# Patient Record
Sex: FEMALE | Race: BLACK OR AFRICAN AMERICAN | NOT HISPANIC OR LATINO | Employment: FULL TIME | URBAN - METROPOLITAN AREA
[De-identification: names, ages, dates, MRNs, and addresses within clinical notes are randomized per-mention and may not be internally consistent; named-entity substitution may affect disease eponyms.]

---

## 2023-07-25 LAB
CHLAMYDIA TRACH., AMPLIFIED: POSITIVE
N. GONORRHEA, AMPLIFIED: NEGATIVE
TRICHOMONAS VAGINALIS: NEGATIVE

## 2023-10-05 PROBLEM — A74.9 CHLAMYDIA INFECTION: Status: ACTIVE | Noted: 2023-10-05

## 2023-10-05 PROBLEM — Z97.5 NEXPLANON IN PLACE: Status: ACTIVE | Noted: 2023-10-05

## 2023-10-05 PROBLEM — Z30.46 NEXPLANON REMOVAL: Status: ACTIVE | Noted: 2023-10-05

## 2023-10-05 PROBLEM — H92.03 OTALGIA OF BOTH EARS: Status: ACTIVE | Noted: 2023-10-05

## 2023-10-05 PROBLEM — J06.9 UPPER RESPIRATORY INFECTION: Status: ACTIVE | Noted: 2023-10-05

## 2023-10-05 PROBLEM — N89.8 VAGINAL DISCHARGE: Status: ACTIVE | Noted: 2023-10-05

## 2023-10-05 RX ORDER — DOXYCYCLINE 100 MG/1
100 CAPSULE ORAL 2 TIMES DAILY
COMMUNITY
End: 2024-05-16 | Stop reason: WASHOUT

## 2023-10-05 RX ORDER — FLUTICASONE PROPIONATE 50 MCG
2 SPRAY, SUSPENSION (ML) NASAL DAILY
COMMUNITY
Start: 2022-05-25 | End: 2024-05-16 | Stop reason: WASHOUT

## 2023-10-05 RX ORDER — METRONIDAZOLE 500 MG/1
1 TABLET ORAL 2 TIMES DAILY
COMMUNITY
Start: 2023-07-24 | End: 2024-05-16 | Stop reason: ALTCHOICE

## 2023-10-05 RX ORDER — MEDROXYPROGESTERONE ACETATE 150 MG/ML
1 INJECTION, SUSPENSION INTRAMUSCULAR
COMMUNITY
Start: 2023-07-24 | End: 2024-05-16 | Stop reason: WASHOUT

## 2023-10-05 RX ORDER — ERYTHROMYCIN AND BENZOYL PEROXIDE 30; 50 MG/G; MG/G
GEL TOPICAL
COMMUNITY
Start: 2015-06-02 | End: 2024-05-16 | Stop reason: WASHOUT

## 2023-10-05 RX ORDER — ETONOGESTREL 68 MG/1
IMPLANT SUBCUTANEOUS
COMMUNITY
Start: 2017-08-09

## 2023-10-13 ENCOUNTER — CLINICAL SUPPORT (OUTPATIENT)
Dept: OBSTETRICS AND GYNECOLOGY | Facility: CLINIC | Age: 23
End: 2023-10-13
Payer: COMMERCIAL

## 2023-10-13 DIAGNOSIS — Z30.42 ENCOUNTER FOR SURVEILLANCE OF INJECTABLE CONTRACEPTIVE: Primary | ICD-10-CM

## 2023-10-13 DIAGNOSIS — Z30.09 GENERAL COUNSELING AND ADVICE FOR CONTRACEPTIVE MANAGEMENT: Primary | ICD-10-CM

## 2023-10-13 PROCEDURE — 96372 THER/PROPH/DIAG INJ SC/IM: CPT | Performed by: NURSE PRACTITIONER

## 2023-10-13 RX ORDER — MEDROXYPROGESTERONE ACETATE 150 MG/ML
150 INJECTION, SUSPENSION INTRAMUSCULAR ONCE
Status: COMPLETED | OUTPATIENT
Start: 2023-10-13 | End: 2023-10-13

## 2023-10-13 RX ADMIN — MEDROXYPROGESTERONE ACETATE 150 MG: 150 INJECTION, SUSPENSION INTRAMUSCULAR at 15:11

## 2023-12-29 ENCOUNTER — APPOINTMENT (OUTPATIENT)
Dept: OBSTETRICS AND GYNECOLOGY | Facility: CLINIC | Age: 23
End: 2023-12-29
Payer: COMMERCIAL

## 2023-12-29 ENCOUNTER — CLINICAL SUPPORT (OUTPATIENT)
Dept: OBSTETRICS AND GYNECOLOGY | Facility: CLINIC | Age: 23
End: 2023-12-29
Payer: COMMERCIAL

## 2023-12-29 DIAGNOSIS — Z30.42 ENCOUNTER FOR SURVEILLANCE OF INJECTABLE CONTRACEPTIVE: ICD-10-CM

## 2023-12-29 PROCEDURE — 96372 THER/PROPH/DIAG INJ SC/IM: CPT | Performed by: NURSE PRACTITIONER

## 2023-12-29 RX ORDER — MEDROXYPROGESTERONE ACETATE 150 MG/ML
150 INJECTION, SUSPENSION INTRAMUSCULAR ONCE
Status: COMPLETED | OUTPATIENT
Start: 2023-12-29 | End: 2023-12-29

## 2023-12-29 RX ADMIN — MEDROXYPROGESTERONE ACETATE 150 MG: 150 INJECTION, SUSPENSION INTRAMUSCULAR at 11:40

## 2023-12-29 NOTE — PROGRESS NOTES
Patient in office today for Depo provera injection, patient has not had the Depo since 07/2023. Patient understands that we need a negative urine pregnancy before give the injection.  Urine pregnancy negative    Depo given IM to left glut w/o difficulty   Patient supplied  Lot# [id number]  Exp: 01/31/2028  NDC 96609-743-80      Jackelyn Kan, [facility]

## 2024-03-22 ENCOUNTER — APPOINTMENT (OUTPATIENT)
Dept: OBSTETRICS AND GYNECOLOGY | Facility: CLINIC | Age: 24
End: 2024-03-22
Payer: COMMERCIAL

## 2024-03-25 ENCOUNTER — APPOINTMENT (OUTPATIENT)
Dept: OBSTETRICS AND GYNECOLOGY | Facility: CLINIC | Age: 24
End: 2024-03-25
Payer: COMMERCIAL

## 2024-03-27 ENCOUNTER — HOSPITAL ENCOUNTER (EMERGENCY)
Facility: HOSPITAL | Age: 24
Discharge: HOME | End: 2024-03-27
Payer: COMMERCIAL

## 2024-03-27 VITALS
BODY MASS INDEX: 19.49 KG/M2 | RESPIRATION RATE: 18 BRPM | DIASTOLIC BLOOD PRESSURE: 84 MMHG | OXYGEN SATURATION: 100 % | SYSTOLIC BLOOD PRESSURE: 137 MMHG | WEIGHT: 110 LBS | TEMPERATURE: 98.4 F | HEART RATE: 98 BPM | HEIGHT: 63 IN

## 2024-03-27 DIAGNOSIS — K08.89 PAIN, DENTAL: Primary | ICD-10-CM

## 2024-03-27 PROCEDURE — 2500000004 HC RX 250 GENERAL PHARMACY W/ HCPCS (ALT 636 FOR OP/ED): Performed by: PHYSICIAN ASSISTANT

## 2024-03-27 PROCEDURE — 2500000001 HC RX 250 WO HCPCS SELF ADMINISTERED DRUGS (ALT 637 FOR MEDICARE OP): Performed by: PHYSICIAN ASSISTANT

## 2024-03-27 PROCEDURE — 99283 EMERGENCY DEPT VISIT LOW MDM: CPT

## 2024-03-27 PROCEDURE — 96372 THER/PROPH/DIAG INJ SC/IM: CPT

## 2024-03-27 PROCEDURE — 96372 THER/PROPH/DIAG INJ SC/IM: CPT | Performed by: PHYSICIAN ASSISTANT

## 2024-03-27 RX ORDER — KETOROLAC TROMETHAMINE 30 MG/ML
15 INJECTION, SOLUTION INTRAMUSCULAR; INTRAVENOUS ONCE
Status: COMPLETED | OUTPATIENT
Start: 2024-03-27 | End: 2024-03-27

## 2024-03-27 RX ORDER — AMOXICILLIN AND CLAVULANATE POTASSIUM 400; 57 MG/5ML; MG/5ML
875 POWDER, FOR SUSPENSION ORAL 2 TIMES DAILY
Qty: 308 ML | Refills: 0 | Status: SHIPPED | OUTPATIENT
Start: 2024-03-27 | End: 2024-04-10

## 2024-03-27 RX ORDER — ACETAMINOPHEN 160 MG/5ML
650 SOLUTION ORAL ONCE
Status: COMPLETED | OUTPATIENT
Start: 2024-03-27 | End: 2024-03-27

## 2024-03-27 RX ORDER — TRIPROLIDINE/PSEUDOEPHEDRINE 2.5MG-60MG
600 TABLET ORAL EVERY 6 HOURS
Qty: 840 ML | Refills: 0 | Status: SHIPPED | OUTPATIENT
Start: 2024-03-27 | End: 2024-04-03

## 2024-03-27 RX ORDER — AMOXICILLIN AND CLAVULANATE POTASSIUM 600; 42.9 MG/5ML; MG/5ML
875 POWDER, FOR SUSPENSION ORAL ONCE
Status: COMPLETED | OUTPATIENT
Start: 2024-03-27 | End: 2024-03-27

## 2024-03-27 RX ADMIN — KETOROLAC TROMETHAMINE 15 MG: 30 INJECTION, SOLUTION INTRAMUSCULAR at 22:58

## 2024-03-27 RX ADMIN — AMOXICILLIN AND CLAVULANATE POTASSIUM 875 MG: 600; 42.9 POWDER, FOR SUSPENSION ORAL at 23:01

## 2024-03-27 RX ADMIN — ACETAMINOPHEN 650 MG: 650 SOLUTION ORAL at 22:58

## 2024-03-27 ASSESSMENT — COLUMBIA-SUICIDE SEVERITY RATING SCALE - C-SSRS
1. IN THE PAST MONTH, HAVE YOU WISHED YOU WERE DEAD OR WISHED YOU COULD GO TO SLEEP AND NOT WAKE UP?: NO
2. HAVE YOU ACTUALLY HAD ANY THOUGHTS OF KILLING YOURSELF?: NO
6. HAVE YOU EVER DONE ANYTHING, STARTED TO DO ANYTHING, OR PREPARED TO DO ANYTHING TO END YOUR LIFE?: NO

## 2024-03-27 ASSESSMENT — PAIN - FUNCTIONAL ASSESSMENT
PAIN_FUNCTIONAL_ASSESSMENT: 0-10
PAIN_FUNCTIONAL_ASSESSMENT: 0-10

## 2024-03-27 ASSESSMENT — PAIN DESCRIPTION - LOCATION
LOCATION: MOUTH
LOCATION: TEETH

## 2024-03-27 ASSESSMENT — PAIN SCALES - GENERAL
PAINLEVEL_OUTOF10: 8
PAINLEVEL_OUTOF10: 6

## 2024-03-27 ASSESSMENT — PAIN DESCRIPTION - PAIN TYPE: TYPE: ACUTE PAIN

## 2024-03-28 NOTE — ED PROVIDER NOTES
HPI     CC: Dental Pain (Left lower)     HPI: Manuela Rodrigez is a 23 y.o. female with no past medical history presents with concern for dental pain.  Patient states that her left lower wisdom tooth has been causing her pain over the last 4 days.  At the same time, she has felt slightly off balance and with some ear pain that also radiates down to the left lower portion of her ear.  She has tried Tylenol and Orajel with some relief.  She is scheduled for wisdom tooth extraction on 4/11.  She called and made that appointment today, however they could not see her in the office.  She has not been on any antibiotics recently.  Denies fevers or chills.  Has been able to eat or drink but does report some sensitivity to cold or hot foods.  She states that she has noticed that food gets stuck in her wisdom teeth often and she has had an infection there before.  This is part of the reason that she has wanting to have them removed.  She reports no neck pain, lightheadedness, or room spinning sensation.  She states that she is on birth control and laughs that the chance of pregnancy.    ROS: 10-point review of systems was performed and is otherwise negative except as noted in HPI.      Past Medical History: Noncontributory except per HPI     Past Surgical History: Noncontributory except per HPI     Family History: Reviewed and noncontributory     Social History:  Noncontributory except per HPI       No Known Allergies    Home Meds:   Current Outpatient Medications   Medication Instructions    amoxicillin-pot clavulanate (Augmentin) 400-57 mg/5 mL suspension 875 mg, oral, 2 times daily    doxycycline (MONODOX) 100 mg, oral, 2 times daily, With food    erythromycin-benzoyl peroxide (Benzamycin) gel 1 Application    etonogestrel-eluting contraceptive (Nexplanon) 68 mg implant implant Inserted 8/9/2017 by Dyana Dooley CNM    fluticasone (Flonase) 50 mcg/actuation nasal spray 2 sprays, Each Nostril, Daily    ibuprofen 600 mg, oral,  "Every 6 hours    medroxyPROGESTERone 150 mg/mL injection 1 mL, intramuscular, Every 3 months    metroNIDAZOLE (Flagyl) 500 mg tablet 1 tablet, oral, 2 times daily        ED Triage Vitals [03/27/24 2232]   Temperature Heart Rate Respirations BP   36.9 °C (98.4 °F) 98 18 137/84      Pulse Ox Temp Source Heart Rate Source Patient Position   100 % Temporal Monitor Sitting      BP Location FiO2 (%)     Left arm --         Heart Rate:  [98]   Temperature:  [36.9 °C (98.4 °F)]   Respirations:  [18]   BP: (137)/(84)   Height:  [160 cm (5' 3\")]   Weight:  [49.9 kg (110 lb)]   Pulse Ox:  [100 %]      Physical Exam:  Physical Exam  Constitutional:       General: She is not in acute distress.     Appearance: Normal appearance. She is not ill-appearing.   HENT:      Head: Normocephalic and atraumatic.      Right Ear: Tympanic membrane and external ear normal.      Left Ear: Tympanic membrane and external ear normal.      Nose: Nose normal.      Mouth/Throat:      Mouth: Mucous membranes are moist.        Comments: No facial swelling, no facial or neck tenderness.  No neck swelling.  No redness or warmth to the face or neck.  Range of motion of the neck is intact.  No posterior oropharynx changes.    Left lower tooth as pictured is the area of tenderness.  Impacted wisdom tooth is present.  Minimal surrounding erythema to the gumline without active abscess.  Right lower wisdom tooth appears similarly but is not uncomfortable at this time.    Braces also in place over the first front teeth of bilateral upper and lower teeth.  Eyes:      Extraocular Movements: Extraocular movements intact.      Conjunctiva/sclera: Conjunctivae normal.      Pupils: Pupils are equal, round, and reactive to light.   Cardiovascular:      Rate and Rhythm: Normal rate and regular rhythm.      Pulses: Normal pulses.   Pulmonary:      Effort: Pulmonary effort is normal. No respiratory distress.      Breath sounds: Normal breath sounds.   Abdominal:      " General: Abdomen is flat.      Palpations: Abdomen is soft.      Tenderness: There is no abdominal tenderness.   Musculoskeletal:         General: Normal range of motion.      Cervical back: Normal range of motion and neck supple.   Skin:     General: Skin is warm and dry.      Capillary Refill: Capillary refill takes less than 2 seconds.   Neurological:      General: No focal deficit present.      Mental Status: She is alert and oriented to person, place, and time.   Psychiatric:         Mood and Affect: Mood normal.          Diagnostic Results        Labs Reviewed - No data to display      No orders to display                 No data recorded                Procedure  Procedures    ED Course & MDM   Assessment/Plan:     Medications   ketorolac (Toradol) injection 15 mg (has no administration in time range)   acetaminophen (Tylenol) oral liquid 650 mg (has no administration in time range)   amoxicillin-pot clavulanate (Augmentin) 600-42.9 mg/5 mL suspension 875 mg (has no administration in time range)        Diagnoses as of 03/27/24 5532   Pain, dental       Medical Decision Making    Manuela Rodrigez is a 23 y.o. female with no past medical history presents with dental pain.  The patient is nontoxic-appearing and her vital signs are normal.  Based on her presentation and symptoms, differential diagnosis includes dental infection, dental carry, impacted wisdom tooth, tooth nerve root impingement, otitis media, or other referred pain.  Based on her physical exam, most likely diagnosis is wisdom tooth impaction versus dental infection.  Patient's ear is unremarkable.  She is balanced upon walking today and has no further signs of being off balance at this time.  Feel that this may be a referred symptom due to some compression near the ear from the impacted wisdom tooth.  Given that is not associated with any other symptoms, would like the patient to try anti-inflammatories and antibiotics and follow-up with dentist  prior to further investigation.  Patient is agreeable to this plan of care.  Will be given Toradol for pain control, Tylenol for pain control, and Augmentin.  Patient specifically requested liquid medications.  Given that she is hemodynamically stable, no further workup indicated at this time.    Dental pain - I discussed the diagnosis of dental pain with the patient.  I recommended calling the dentist office to discuss an earlier surgical date and/or follow-up exam within the next few days.  I have prescribed Ibuprofen and Tylenol for pain control. I have given Augmentin liquid for treatment of infection encouraged the patient to take this in its entirety.  We discussed that.  The combination of treatment should hopefully improve the symptoms that she is also experiencing in her left ear.  If it does not, highly recommended returning to the emergency department or seeing primary care physician.  The patient agrees to call her dentist tomorrow for repeat exam and/or earlier appointment for wisdom tooth extraction.  We did discuss that ultimately, her pain will improve when these are removed.  Also highly encouraged her to keep this area clean as she does report that food gets trapped behind the tooth often which could lead to further infection.  Advised to return to the ED should she experience new or worsening symptoms including but not limited to fever, chills, worsening pain, drainage from tooth, or any new symptoms. Patient was agreeable to discharge home.    Disposition: Home    ED Prescriptions       Medication Sig Dispense Start Date End Date Auth. Provider    amoxicillin-pot clavulanate (Augmentin) 400-57 mg/5 mL suspension Take 10.9 mL (875 mg) by mouth 2 times a day for 14 days. 308 mL 3/27/2024 4/10/2024 Светлана Turner PA-C    ibuprofen 100 mg/5 mL suspension Take 30 mL (600 mg) by mouth every 6 hours for 7 days. 840 mL 3/27/2024 4/3/2024 Светлана Turner PA-C            Social Determinants Affecting  Care: none     Светлана Turner PA-C    This note was dictated by speech recognition. Minor errors in transcription may be present.     Светлана Turner PA-C  03/27/24 4435

## 2024-03-31 ENCOUNTER — CLINICAL SUPPORT (OUTPATIENT)
Dept: EMERGENCY MEDICINE | Facility: HOSPITAL | Age: 24
End: 2024-03-31
Payer: COMMERCIAL

## 2024-03-31 ENCOUNTER — HOSPITAL ENCOUNTER (EMERGENCY)
Facility: HOSPITAL | Age: 24
Discharge: HOME | End: 2024-03-31
Attending: EMERGENCY MEDICINE
Payer: COMMERCIAL

## 2024-03-31 VITALS
OXYGEN SATURATION: 100 % | DIASTOLIC BLOOD PRESSURE: 69 MMHG | HEART RATE: 77 BPM | SYSTOLIC BLOOD PRESSURE: 111 MMHG | TEMPERATURE: 98 F | RESPIRATION RATE: 18 BRPM

## 2024-03-31 DIAGNOSIS — R42 VERTIGO: Primary | ICD-10-CM

## 2024-03-31 PROCEDURE — 99284 EMERGENCY DEPT VISIT MOD MDM: CPT | Performed by: EMERGENCY MEDICINE

## 2024-03-31 PROCEDURE — 99283 EMERGENCY DEPT VISIT LOW MDM: CPT | Mod: 25

## 2024-03-31 PROCEDURE — 2500000001 HC RX 250 WO HCPCS SELF ADMINISTERED DRUGS (ALT 637 FOR MEDICARE OP)

## 2024-03-31 PROCEDURE — 93005 ELECTROCARDIOGRAM TRACING: CPT

## 2024-03-31 PROCEDURE — 93010 ELECTROCARDIOGRAM REPORT: CPT | Performed by: EMERGENCY MEDICINE

## 2024-03-31 RX ORDER — MECLIZINE HCL 12.5 MG 12.5 MG/1
25 TABLET ORAL ONCE
Status: COMPLETED | OUTPATIENT
Start: 2024-03-31 | End: 2024-03-31

## 2024-03-31 RX ORDER — MECLIZINE HYDROCHLORIDE 25 MG/1
25 TABLET ORAL 3 TIMES DAILY PRN
Qty: 90 TABLET | Refills: 0 | Status: SHIPPED | OUTPATIENT
Start: 2024-03-31 | End: 2024-04-30

## 2024-03-31 RX ADMIN — MECLIZINE 25 MG: 12.5 TABLET ORAL at 16:15

## 2024-03-31 ASSESSMENT — COLUMBIA-SUICIDE SEVERITY RATING SCALE - C-SSRS
2. HAVE YOU ACTUALLY HAD ANY THOUGHTS OF KILLING YOURSELF?: NO
6. HAVE YOU EVER DONE ANYTHING, STARTED TO DO ANYTHING, OR PREPARED TO DO ANYTHING TO END YOUR LIFE?: NO
1. IN THE PAST MONTH, HAVE YOU WISHED YOU WERE DEAD OR WISHED YOU COULD GO TO SLEEP AND NOT WAKE UP?: NO

## 2024-03-31 NOTE — DISCHARGE INSTRUCTIONS
You have been diagnosed with vertigo here in the emergency department today.  You have been provided with a prescription for meclizine which you are instructed to take 3 times per day as needed for your symptoms.  Please return the emergency department if you begin having significant worsening of your symptoms, weakness on one side of the body versus the other, changes in sensation, significant changes in vision or any other concerning symptoms.  You should follow-up with your primary care provider for continued monitoring and refills for this prescription.

## 2024-03-31 NOTE — ED PROVIDER NOTES
CC: Dizziness     History provided by: Patient  Limitations to History: None    HPI:  Manuela Rodrigez is a 23 y.o. female with no pertinent past medical history that presents to the emergency department today for dizziness.  She states that this has been intermittent over the past few months and describes this as room spinning and some mild lightheadedness.  She says that this does come on when she is driving and changing positions and improves when she lays down.  The patient does not take any daily medications and is on the Depo shot for birth control.  The patient denies any associated chest pain, shortness of breath, syncopal episodes, palpitations, N/V/D, fevers, back pain, neck pain/stiffness or any other concerning symptoms.  Patient does have an impacted wisdom tooth that she is scheduled to have pulled in the next few weeks.  ???????????????????????????????????????????????????????????????  Triage Vitals:  T 36.7 °C (98 °F)  HR 77  /69  RR 18  O2 100 %      Vital signs reviewed in nursing triage note, EMR flow sheets, and at patient's bedside.   General: Awake, alert, in no acute distress  Eyes: Gaze conjugate.  No scleral icterus or injection  HENT: Normo-cephalic, atraumatic. No stridor. No rhinorrhea or epistaxis.  CV: Regular rhythm. No murmurs appreciated. Radial pulses 2+ bilaterally  Respiratory: Breathing non-labored, speaking in full sentences.  Clear to auscultation bilaterally  GI: Soft, non-distended, non-tender. No rebound or guarding.  MSK/Extremities: No gross bony deformities. Moving all extremities. No lower extremity edema.  Skin: Warm. Appropriate color  Neuro: A&Ox3.  Normal speech pattern. PERRLA. EOMI. Visual fields intact bilaterally.  No facial droop.  Symmetric smile.  Equal cheek puffing.  Normal sensation to light touch in V1-3.  5/5 strength shoulder shrug.  5/5 strength in bilateral UE and LE.  Normal sensation to light touch in bilateral UE and LE. No dysmetria w/  finger-nose-finger or heel-shin bilaterally. No ataxic gait.  Joyce-Hallpike positive  Psych: Appropriate mood and affect   ???????????????????????????????????????????????????????????????  ED Course/Treatment/Medical Decision Making  MDM:  Manuela Rodrigez is a 23 y.o. female with no pertinent past medical history that presents to the emergency department today for symptoms consistent with vertigo.  Upon arrival to the emergency department patient had stable vital signs and was afebrile.  Patient had a normal neurologic exam but was Burton-Hallpike positive making vertigo the most likely diagnosis.  The patient was treated with meclizine and her symptoms improved after administration of this.    Differential diagnoses considered include but are not limited to: CVA, vertigo, dehydration, arrhythmia    Independent Interpretation of Studies:  I independently interpreted: See ED Course Below  -EKG    ED Course:  ED Course as of 03/31/24 1635   Sun Mar 31, 2024   1623 ECG 12 lead  EKG personally interpreted by me showing NSR at rate of 79 bpm with normal axis.  MN, QRS and QTc intervals are within normal limits.  No ST elevation or T wave inversions noted.  No STEMI.  T wave inversions in lead V1-V3 and lead V6 no longer present from previous EKG on 8/29/2003. [RS]      ED Course User Index  [RS] Demian Jarrell, DO         Diagnoses as of 03/31/24 1635   Vertigo     The patient was monitored for any change in vital signs or symptoms and remained hemodynamically stable throughout the ED course.  Her symptoms did improve after treatment with meclizine and felt safe for discharge home.  The patient was provided with strict return precautions including focal weakness, altered sensation, vision changes, significant worsening of her dizziness/lightheadedness, syncopal episode or any other concerning symptoms.  Patient was also instructed to follow-up with her primary care provider within the next 2 weeks for reevaluation of her  symptoms and prescription refills.  The patient verbalized her understanding of this and she was discharged home in stable condition.    Social Determinants Limiting Care:  None identified    Impression:  Vertigo    Disposition:  Discharge home    Assessment and plan discussed with Dr. John Jarrell, DO   Emergency Medicine, PGY-1     Disclaimer: This note was dictated by speech recognition. Minor errors in transcription may be present.     Procedures ? [employer] last updated 3/31/2024 3:57 PM        Demian Jarrell,   Resident  03/31/24 4524

## 2024-03-31 NOTE — ED TRIAGE NOTES
"Pt presents to the ED for dizziness that has been going on for \"a while\"  States it is intermittent, happens while she is driving (also has anxiety while driving)  Thinks her dizziness is getting worse because she needs her wisdom tooth removed  "

## 2024-04-03 LAB
ATRIAL RATE: 79 BPM
P AXIS: 68 DEGREES
P OFFSET: 183 MS
P ONSET: 138 MS
PR INTERVAL: 164 MS
Q ONSET: 220 MS
QRS COUNT: 13 BEATS
QRS DURATION: 76 MS
QT INTERVAL: 372 MS
QTC CALCULATION(BAZETT): 426 MS
QTC FREDERICIA: 407 MS
R AXIS: 69 DEGREES
T AXIS: 48 DEGREES
T OFFSET: 406 MS
VENTRICULAR RATE: 79 BPM

## 2024-04-28 ENCOUNTER — CLINICAL SUPPORT (OUTPATIENT)
Dept: EMERGENCY MEDICINE | Facility: HOSPITAL | Age: 24
End: 2024-04-28
Payer: COMMERCIAL

## 2024-04-28 ENCOUNTER — HOSPITAL ENCOUNTER (EMERGENCY)
Facility: HOSPITAL | Age: 24
Discharge: HOME | End: 2024-04-28
Attending: EMERGENCY MEDICINE
Payer: COMMERCIAL

## 2024-04-28 VITALS
SYSTOLIC BLOOD PRESSURE: 120 MMHG | DIASTOLIC BLOOD PRESSURE: 81 MMHG | WEIGHT: 116 LBS | HEART RATE: 90 BPM | OXYGEN SATURATION: 100 % | HEIGHT: 62 IN | BODY MASS INDEX: 21.35 KG/M2 | TEMPERATURE: 97.7 F | RESPIRATION RATE: 16 BRPM

## 2024-04-28 DIAGNOSIS — R42 DIZZY: Primary | ICD-10-CM

## 2024-04-28 LAB
ANION GAP SERPL CALC-SCNC: 15 MMOL/L (ref 10–20)
BASOPHILS # BLD MANUAL: 0.05 X10*3/UL (ref 0–0.1)
BASOPHILS NFR BLD MANUAL: 1.4 %
BUN SERPL-MCNC: 7 MG/DL (ref 6–23)
CALCIUM SERPL-MCNC: 9.4 MG/DL (ref 8.6–10.6)
CHLORIDE SERPL-SCNC: 103 MMOL/L (ref 98–107)
CO2 SERPL-SCNC: 25 MMOL/L (ref 21–32)
CREAT SERPL-MCNC: 0.73 MG/DL (ref 0.5–1.05)
D DIMER PPP FEU-MCNC: 352 NG/ML FEU
EGFRCR SERPLBLD CKD-EPI 2021: >90 ML/MIN/1.73M*2
EOSINOPHIL # BLD MANUAL: 0.05 X10*3/UL (ref 0–0.7)
EOSINOPHIL NFR BLD MANUAL: 1.3 %
ERYTHROCYTE [DISTWIDTH] IN BLOOD BY AUTOMATED COUNT: 11.4 % (ref 11.5–14.5)
GLUCOSE SERPL-MCNC: 75 MG/DL (ref 74–99)
HCT VFR BLD AUTO: 38.9 % (ref 36–46)
HGB BLD-MCNC: 13.9 G/DL (ref 12–16)
IMM GRANULOCYTES # BLD AUTO: 0 X10*3/UL (ref 0–0.7)
IMM GRANULOCYTES NFR BLD AUTO: 0 % (ref 0–0.9)
LYMPHOCYTES # BLD MANUAL: 1.69 X10*3/UL (ref 1.2–4.8)
LYMPHOCYTES NFR BLD MANUAL: 48.3 %
MAGNESIUM SERPL-MCNC: 1.96 MG/DL (ref 1.6–2.4)
MCH RBC QN AUTO: 31.4 PG (ref 26–34)
MCHC RBC AUTO-ENTMCNC: 35.7 G/DL (ref 32–36)
MCV RBC AUTO: 88 FL (ref 80–100)
MONOCYTES # BLD MANUAL: 0.17 X10*3/UL (ref 0.1–1)
MONOCYTES NFR BLD MANUAL: 4.8 %
NEUTROPHILS # BLD MANUAL: 1.52 X10*3/UL (ref 1.2–7.7)
NEUTS BAND # BLD MANUAL: 0.38 X10*3/UL (ref 0–0.7)
NEUTS BAND NFR BLD MANUAL: 10.9 %
NEUTS SEG # BLD MANUAL: 1.14 X10*3/UL (ref 1.2–7)
NEUTS SEG NFR BLD MANUAL: 32.6 %
NRBC BLD-RTO: 0 /100 WBCS (ref 0–0)
PLATELET # BLD AUTO: 271 X10*3/UL (ref 150–450)
POTASSIUM SERPL-SCNC: 3 MMOL/L (ref 3.5–5.3)
PREGNANCY TEST URINE, POC: NEGATIVE
RBC # BLD AUTO: 4.43 X10*6/UL (ref 4–5.2)
RBC MORPH BLD: ABNORMAL
SODIUM SERPL-SCNC: 140 MMOL/L (ref 136–145)
TOTAL CELLS COUNTED BLD: 147
TSH SERPL-ACNC: 2.29 MIU/L (ref 0.44–3.98)
VARIANT LYMPHS # BLD MANUAL: 0.02 X10*3/UL (ref 0–0.5)
VARIANT LYMPHS NFR BLD: 0.7 %
WBC # BLD AUTO: 3.5 X10*3/UL (ref 4.4–11.3)

## 2024-04-28 PROCEDURE — 81025 URINE PREGNANCY TEST: CPT | Performed by: NURSE PRACTITIONER

## 2024-04-28 PROCEDURE — 83735 ASSAY OF MAGNESIUM: CPT | Performed by: NURSE PRACTITIONER

## 2024-04-28 PROCEDURE — 2500000001 HC RX 250 WO HCPCS SELF ADMINISTERED DRUGS (ALT 637 FOR MEDICARE OP): Performed by: NURSE PRACTITIONER

## 2024-04-28 PROCEDURE — 85027 COMPLETE CBC AUTOMATED: CPT | Performed by: NURSE PRACTITIONER

## 2024-04-28 PROCEDURE — 36415 COLL VENOUS BLD VENIPUNCTURE: CPT | Performed by: NURSE PRACTITIONER

## 2024-04-28 PROCEDURE — 80048 BASIC METABOLIC PNL TOTAL CA: CPT | Performed by: NURSE PRACTITIONER

## 2024-04-28 PROCEDURE — 85007 BL SMEAR W/DIFF WBC COUNT: CPT | Performed by: NURSE PRACTITIONER

## 2024-04-28 PROCEDURE — 85379 FIBRIN DEGRADATION QUANT: CPT | Performed by: NURSE PRACTITIONER

## 2024-04-28 PROCEDURE — 99285 EMERGENCY DEPT VISIT HI MDM: CPT | Performed by: NURSE PRACTITIONER

## 2024-04-28 PROCEDURE — 84443 ASSAY THYROID STIM HORMONE: CPT | Performed by: NURSE PRACTITIONER

## 2024-04-28 PROCEDURE — 99283 EMERGENCY DEPT VISIT LOW MDM: CPT | Mod: 25

## 2024-04-28 PROCEDURE — 93005 ELECTROCARDIOGRAM TRACING: CPT

## 2024-04-28 RX ORDER — POTASSIUM CHLORIDE 20 MEQ/1
40 TABLET, EXTENDED RELEASE ORAL DAILY
Status: DISCONTINUED | OUTPATIENT
Start: 2024-04-28 | End: 2024-04-28

## 2024-04-28 RX ORDER — ONDANSETRON 4 MG/1
4 TABLET, ORALLY DISINTEGRATING ORAL EVERY 6 HOURS PRN
Qty: 20 TABLET | Refills: 0 | Status: SHIPPED | OUTPATIENT
Start: 2024-04-28 | End: 2024-05-21 | Stop reason: WASHOUT

## 2024-04-28 RX ORDER — POTASSIUM CHLORIDE 1.5 G/1.58G
40 POWDER, FOR SOLUTION ORAL 2 TIMES DAILY
Status: DISCONTINUED | OUTPATIENT
Start: 2024-04-28 | End: 2024-04-28 | Stop reason: HOSPADM

## 2024-04-28 RX ADMIN — POTASSIUM CHLORIDE 40 MEQ: 1.5 POWDER, FOR SOLUTION ORAL at 16:22

## 2024-04-28 ASSESSMENT — COLUMBIA-SUICIDE SEVERITY RATING SCALE - C-SSRS
6. HAVE YOU EVER DONE ANYTHING, STARTED TO DO ANYTHING, OR PREPARED TO DO ANYTHING TO END YOUR LIFE?: NO
2. HAVE YOU ACTUALLY HAD ANY THOUGHTS OF KILLING YOURSELF?: NO
1. IN THE PAST MONTH, HAVE YOU WISHED YOU WERE DEAD OR WISHED YOU COULD GO TO SLEEP AND NOT WAKE UP?: NO

## 2024-04-28 NOTE — ED PROVIDER NOTES
HPI   Chief Complaint   Patient presents with    Vertigo       HPI  This is a 23 year old female with no significant past medical history presents to the ED with vertigo like symptoms. She is on Depo.   She states that when she changes position she feels nauseous and the room is spinning. She was here about a month ago for the same and was discharged home with meclizine. She has been taking meclizine with no relief in her symptoms. She also has had dyspnea associated with her symptoms but she is not sure whether it is because of anxiety or true dyspnea because something is wrong.  Denies chest pain, no recent travel and no known sick contact although she works here at .                     No data recorded                   Patient History   Past Medical History:   Diagnosis Date    Other specified health status     No pertinent past medical history     Past Surgical History:   Procedure Laterality Date    OTHER SURGICAL HISTORY  10/17/2019    No history of surgery     No family history on file.  Social History     Tobacco Use    Smoking status: Not on file    Smokeless tobacco: Not on file   Substance Use Topics    Alcohol use: Not on file    Drug use: Not on file       Physical Exam   ED Triage Vitals [04/28/24 1342]   Temperature Heart Rate Respirations BP   36.5 °C (97.7 °F) 90 16 120/81      Pulse Ox Temp src Heart Rate Source Patient Position   100 % -- Monitor Sitting      BP Location FiO2 (%)     Right arm --       Physical Exam  Constitutional:       Appearance: Normal appearance.   HENT:      Head: Normocephalic and atraumatic.      Mouth/Throat:      Mouth: Mucous membranes are moist.   Eyes:      Extraocular Movements: Extraocular movements intact.      Pupils: Pupils are equal, round, and reactive to light.   Cardiovascular:      Rate and Rhythm: Normal rate and regular rhythm.   Pulmonary:      Effort: Pulmonary effort is normal.      Breath sounds: Normal breath sounds.   Abdominal:      General:  Abdomen is flat.      Palpations: Abdomen is soft.   Musculoskeletal:         General: Normal range of motion.      Cervical back: Normal range of motion and neck supple.   Skin:     General: Skin is warm and dry.   Neurological:      General: No focal deficit present.      Mental Status: She is alert and oriented to person, place, and time.   Psychiatric:         Mood and Affect: Mood normal.         Behavior: Behavior normal.         Thought Content: Thought content normal.         Judgment: Judgment normal.         ED Course & MDM   Diagnoses as of 04/28/24 1722   Dizzy       Medical Decision Making  This is a 23 year old female with no significant past medical history presents to the ED with vertigo like symptoms. She is on Depo.   She states that when she changes position she feels nauseous and the room is spinning. She was here about a month ago for the same and was discharged home with meclizine. She has been taking meclizine with no relief in her symptoms. She also has had dyspnea associated with her symptoms but she is not sure whether it is because of anxiety or true dyspnea because something is wrong.  Denies chest pain, no recent travel and no known sick contact although she works here at .   Differential Dx- vertigo, dehydration, intracranial abnormalities, vascular abnormalities, tumor   ECG showed Hr of 80 with DC interval of 168 ms, non-specific ST abnormalities, with no acute ischemia       Labs-  mostly unremarkable except for potassium of 3 for which she received 40 meq of potassium, TSH 2.296 and dimer of 352  On exam she had no nystagmus and I was not able to illicit her symptoms. She had no lightheadedness and no nausea or vomiting. Had no chest pain , headache , blurry or double vision.    The patient was staffed with Dr. Polk who recommended MRI of the brain with MRV but the patient wished not to wait for the MRI since it was going to be done after 7 pm.   She was discharged home with  an Rx for Zofran and I instructed her to follow up with her primary care.     Procedure  Procedures     Zonia Peña, GRAHAM-CNP, DNP  04/28/24 6454

## 2024-04-28 NOTE — ED TRIAGE NOTES
Pt state that start happened 2 month's ago when she was Dx with Vertigo. Pt supposed to call Neuro and make appointment she never did. Pt state that she start having nausea and room start spinning. No LOC

## 2024-04-29 ENCOUNTER — CLINICAL SUPPORT (OUTPATIENT)
Dept: EMERGENCY MEDICINE | Facility: HOSPITAL | Age: 24
End: 2024-04-29
Payer: COMMERCIAL

## 2024-04-29 ENCOUNTER — HOSPITAL ENCOUNTER (EMERGENCY)
Facility: HOSPITAL | Age: 24
Discharge: HOME | End: 2024-04-29
Attending: EMERGENCY MEDICINE
Payer: COMMERCIAL

## 2024-04-29 VITALS
TEMPERATURE: 98.6 F | HEIGHT: 62 IN | OXYGEN SATURATION: 100 % | SYSTOLIC BLOOD PRESSURE: 103 MMHG | RESPIRATION RATE: 16 BRPM | WEIGHT: 116 LBS | BODY MASS INDEX: 21.35 KG/M2 | DIASTOLIC BLOOD PRESSURE: 67 MMHG | HEART RATE: 85 BPM

## 2024-04-29 DIAGNOSIS — R42 LIGHTHEADEDNESS: Primary | ICD-10-CM

## 2024-04-29 LAB
ANION GAP SERPL CALC-SCNC: 13 MMOL/L (ref 10–20)
ATRIAL RATE: 85 BPM
BASOPHILS # BLD MANUAL: 0 X10*3/UL (ref 0–0.1)
BASOPHILS NFR BLD MANUAL: 0 %
BUN SERPL-MCNC: 6 MG/DL (ref 6–23)
CALCIUM SERPL-MCNC: 9 MG/DL (ref 8.6–10.6)
CHLORIDE SERPL-SCNC: 107 MMOL/L (ref 98–107)
CO2 SERPL-SCNC: 22 MMOL/L (ref 21–32)
CREAT SERPL-MCNC: 0.71 MG/DL (ref 0.5–1.05)
EGFRCR SERPLBLD CKD-EPI 2021: >90 ML/MIN/1.73M*2
EOSINOPHIL # BLD MANUAL: 0 X10*3/UL (ref 0–0.7)
EOSINOPHIL NFR BLD MANUAL: 0 %
ERYTHROCYTE [DISTWIDTH] IN BLOOD BY AUTOMATED COUNT: 11.5 % (ref 11.5–14.5)
GLUCOSE SERPL-MCNC: 90 MG/DL (ref 74–99)
HCT VFR BLD AUTO: 34.7 % (ref 36–46)
HGB BLD-MCNC: 12.4 G/DL (ref 12–16)
IMM GRANULOCYTES # BLD AUTO: 0.01 X10*3/UL (ref 0–0.7)
IMM GRANULOCYTES NFR BLD AUTO: 0.3 % (ref 0–0.9)
LYMPHOCYTES # BLD MANUAL: 1.61 X10*3/UL (ref 1.2–4.8)
LYMPHOCYTES NFR BLD MANUAL: 42.4 %
MCH RBC QN AUTO: 31.3 PG (ref 26–34)
MCHC RBC AUTO-ENTMCNC: 35.7 G/DL (ref 32–36)
MCV RBC AUTO: 88 FL (ref 80–100)
MONOCYTES # BLD MANUAL: 0.42 X10*3/UL (ref 0.1–1)
MONOCYTES NFR BLD MANUAL: 11 %
NEUTS SEG # BLD MANUAL: 1.71 X10*3/UL (ref 1.2–7)
NEUTS SEG NFR BLD MANUAL: 44.9 %
NRBC BLD-RTO: 0 /100 WBCS (ref 0–0)
P AXIS: 81 DEGREES
P OFFSET: 183 MS
P ONSET: 140 MS
PLATELET # BLD AUTO: 249 X10*3/UL (ref 150–450)
POTASSIUM SERPL-SCNC: 3.6 MMOL/L (ref 3.5–5.3)
PR INTERVAL: 168 MS
Q ONSET: 224 MS
QRS COUNT: 13 BEATS
QRS DURATION: 80 MS
QT INTERVAL: 362 MS
QTC CALCULATION(BAZETT): 430 MS
QTC FREDERICIA: 406 MS
R AXIS: 82 DEGREES
RBC # BLD AUTO: 3.96 X10*6/UL (ref 4–5.2)
RBC MORPH BLD: NORMAL
SODIUM SERPL-SCNC: 138 MMOL/L (ref 136–145)
T AXIS: 87 DEGREES
T OFFSET: 405 MS
TOTAL CELLS COUNTED BLD: 118
VARIANT LYMPHS # BLD MANUAL: 0.03 X10*3/UL (ref 0–0.5)
VARIANT LYMPHS NFR BLD: 0.8 %
VENTRICULAR RATE: 85 BPM
WBC # BLD AUTO: 3.8 X10*3/UL (ref 4.4–11.3)

## 2024-04-29 PROCEDURE — 93005 ELECTROCARDIOGRAM TRACING: CPT

## 2024-04-29 PROCEDURE — 85027 COMPLETE CBC AUTOMATED: CPT | Performed by: NURSE PRACTITIONER

## 2024-04-29 PROCEDURE — 85007 BL SMEAR W/DIFF WBC COUNT: CPT | Performed by: NURSE PRACTITIONER

## 2024-04-29 PROCEDURE — 99284 EMERGENCY DEPT VISIT MOD MDM: CPT | Performed by: NURSE PRACTITIONER

## 2024-04-29 PROCEDURE — 99283 EMERGENCY DEPT VISIT LOW MDM: CPT | Mod: 25

## 2024-04-29 PROCEDURE — 80048 BASIC METABOLIC PNL TOTAL CA: CPT | Performed by: NURSE PRACTITIONER

## 2024-04-29 PROCEDURE — 36415 COLL VENOUS BLD VENIPUNCTURE: CPT | Performed by: NURSE PRACTITIONER

## 2024-04-29 ASSESSMENT — PAIN SCALES - GENERAL: PAINLEVEL_OUTOF10: 0 - NO PAIN

## 2024-04-29 ASSESSMENT — LIFESTYLE VARIABLES
EVER FELT BAD OR GUILTY ABOUT YOUR DRINKING: NO
HAVE PEOPLE ANNOYED YOU BY CRITICIZING YOUR DRINKING: NO
TOTAL SCORE: 0
HAVE YOU EVER FELT YOU SHOULD CUT DOWN ON YOUR DRINKING: NO
EVER HAD A DRINK FIRST THING IN THE MORNING TO STEADY YOUR NERVES TO GET RID OF A HANGOVER: NO

## 2024-04-29 ASSESSMENT — PAIN - FUNCTIONAL ASSESSMENT: PAIN_FUNCTIONAL_ASSESSMENT: 0-10

## 2024-04-29 NOTE — ED TRIAGE NOTES
Patient arrived to ED reporting that she feels lightheaded, dizzy and nauseous she was diagnosed with vertigo 4 weeks ago and was instructed to make a follow up appointment with neurology however she has not done that she states that she was prescribed meclizine however she does not take it because she states that it does not work.

## 2024-04-29 NOTE — ED PROVIDER NOTES
Emergency Department Encounter  [facility] EMERGENCY MEDICINE    Patient: Manuela Rodrigez  MRN: [id number]  : 2000  Date of Evaluation: 2024  ED Provider: IRVING Martinez      Chief Complaint       Chief Complaint   Patient presents with   • Dizziness     [place]       Limitations to History: None   Historian: Patient  Records reviewed: EMR inpatient and outpatient notes, Care Everywhere    Manuela Rodrigez is a 23 y.o. female who presents to the emergency department complaining of dizziness.  Patient states that it feels like she is lightheaded.  She states that sometimes when she stands up she gets dizzy but a lot of times she feels lightheaded when she is driving.  Her symptoms have been going on for a month.  She denies any headache or vision changes.  She does have some nausea.  And some overall body weakness.  This is her third visit to the emergency department for the symptoms.  She states that she has been taking meclizine for over a month and has started Zofran which are not helping her symptoms.  She is on Depo her last shot was 4 weeks ago.  She has scheduled appointments for her family physician in May as well as for an ENT in May as well.  Yesterday she was in the emergency department for the symptoms.  She was offered an MRI to rule out causes of her dizziness and she has declined.  She is back today and would like to have the MRI completed    ROS:     Review of Systems  All other systems have been reviewed and are otherwise acutely negative except as in the [place].    Past History     Past Medical History:   Diagnosis Date   • Other specified health status     No pertinent past medical history     Past Surgical History:   Procedure Laterality Date   • OTHER SURGICAL HISTORY  10/17/2019    No history of surgery       Medications/Allergies     Previous Medications    DOXYCYCLINE (MONODOX) 100 MG CAPSULE    Take 1 capsule (100 mg) by mouth 2 times a day. With food     ERYTHROMYCIN-BENZOYL PEROXIDE (BENZAMYCIN) GEL    1 Application    ETONOGESTREL-ELUTING CONTRACEPTIVE (NEXPLANON) 68 MG IMPLANT IMPLANT    Inserted 8/9/2017 by Dyana Dooley CNM    FLUTICASONE (FLONASE) 50 MCG/ACTUATION NASAL SPRAY    Administer 2 sprays into each nostril once daily.    MECLIZINE (ANTIVERT) 25 MG TABLET    Take 1 tablet (25 mg) by mouth 3 times a day as needed for dizziness.    MEDROXYPROGESTERONE 150 MG/ML INJECTION    Inject 1 mL (150 mg) into the shoulder, thigh, or buttocks every 3 (three) months.    METRONIDAZOLE (FLAGYL) 500 MG TABLET    Take 1 tablet (500 mg) by mouth 2 times a day.    ONDANSETRON ODT (ZOFRAN-ODT) 4 MG DISINTEGRATING TABLET    Take 1 tablet (4 mg) by mouth every 6 hours if needed for nausea.     No Known Allergies     Physical Exam       ED Triage Vitals [04/29/24 0732]   Temperature Heart Rate Respirations BP   37 °C (98.6 °F) 85 16 103/67      Pulse Ox Temp Source Heart Rate Source Patient Position   100 % Temporal Monitor Sitting      BP Location FiO2 (%)     Right arm 21 %         Physical Exam    GENERAL:  The patient appears nourished and normally developed. Vital signs as documented.     HEENT:  Head normocephalic, atraumatic, EOMs intact, PERRLA, Mucous membranes moist. Nares patent without copious rhinorrhea.      PULMONARY:  Lungs are clear to auscultation, without any respiratory distress. Able to speak full sentences, no accessory muscle use    CARDIAC:   Normal rate. No murmurs, rubs or gallops    MUSCULOSKELETAL:   No tenderness of cervical, thoracic and lumbar spine, no step off or deformity noted,  Able to ambulate, Non edematous, with no obvious deformities    SKIN:   Good color, with no significant rashes on exposed skin      NEURO:  normal sensation and strength bilaterally.  Able to follow commands, CN 2-12 grossly intact good rapid hand coordination good heel-to-shin good finger-to-nose smile is midline pupils equal round reactive to light extraocular  movements are intact      Diagnostics   Labs:  Labs Reviewed   CBC WITH AUTO DIFFERENTIAL - Abnormal       Result Value    WBC 3.8 (*)     nRBC 0.0      RBC 3.96 (*)     Hemoglobin 12.4      Hematocrit 34.7 (*)     MCV 88      MCH 31.3      MCHC 35.7      RDW 11.5      Platelets 249      Immature Granulocytes %, Automated 0.3      Immature Granulocytes Absolute, Automated 0.01      Narrative:     The previously reported component Neutrophils % is no longer being reported.  The previously reported component Lymphocytes % is no longer being reported.  The previously reported component Monocytes % is no longer being reported.  The previously   reported component Eosinophils % is no longer being reported.  The previously reported component Basophils % is no longer being reported.  The previously reported component Absolute Neutrophils is no longer being reported.  The previously reported   component Absolute Lymphocytes is no longer being reported.  The previously reported component Absolute Monocytes is no longer being reported.  The previously reported component Absolute Eosinophils is no longer being reported.  The previously reported   component Absolute Basophils is no longer being reported.   BASIC METABOLIC PANEL - Normal    Glucose 90      Sodium 138      Potassium 3.6      Chloride 107      Bicarbonate 22      Anion Gap 13      Urea Nitrogen 6      Creatinine 0.71      eGFR >90      Calcium 9.0     MANUAL DIFFERENTIAL    Neutrophils %, Manual 44.9      Lymphocytes %, Manual 42.4      Monocytes %, Manual 11.0      Eosinophils %, Manual 0.0      Basophils %, Manual 0.0      Atypical Lymphocytes %, Manual 0.8      Seg Neutrophils Absolute, Manual 1.71      Lymphocytes Absolute, Manual 1.61      Monocytes Absolute, Manual 0.42      Eosinophils Absolute, Manual 0.00      Basophils Absolute, Manual 0.00      Atypical Lymphs Absolute, Manual 0.03      Total Cells Counted 118      RBC Morphology No significant RBC  "morphology present       Radiographs:  MR brain wo IV contrast    (Results Pending)         EKG: interpreted by attending physician.  EKG shows normal sinus rhythm with nonspecific ST abnormalities with a ventricular rate of 85 VA interval of 168 QRS of 80      Assessment   In brief, Manuela Rodrigez is a 23 y.o. female who presented to the emergency department patient presents for lightheadedness.  Patient was offered an MRI last night but declined as she did not want to wait to have the test done.  Today she states that she would like to have the test done.  I did inform her that I was not sure how long it was going to take that she may be here for several hours and that we would have to wait for the results.  Patient assured me that she would be able to wait to have the test done as well as wait for the results.  Patient's lab work was completed her CBC shows no anemia or leukocytosis.  Patient's BMP shows a normal potassium level where her potassium yesterday was low.  Unfortunately the patient did not wait to have the MRI completed and left without completing treatment      Differentials   Benign positional vertigo  Dehydration  Electrolyte imbalance        4.   Brain tumor        5.   Aneurysm  ED Course     ED Course as of 04/29/24 1227   Mon Apr 29, 2024   1227 MR brain wo IV contrast [AS]      ED Course User Index  [AS] Lara Wheatley, APRN-CNP         Diagnoses as of 04/29/24 1227   Lightheadedness       Visit Vitals  /67 (BP Location: Right arm, Patient Position: Sitting)   Pulse 85   Temp 37 °C (98.6 °F) (Temporal)   Resp 16   Ht 1.575 m (5' 2\")   Wt 52.6 kg (116 lb)   SpO2 100%   BMI 21.22 kg/m²   OB Status Having periods   Smoking Status Unknown   BSA 1.52 m²       Medications - No data to display    Plan of care discussed, patient verbalizes understanding of plan of care and is in agreement.      Final Impression      1. Lightheadedness          DISPOSITION  Disposition: Discharge  Patient " condition is: Stable    Comment: Please note this report has been produced using speech recognition software and may contain errors related to that system including errors in grammar, punctuation, and spelling, as well as words and phrases that may be inappropriate.  If there are any questions or concerns please feel free to contact the dictating provider for clarification.    IRVING Martinez APRN-CNP  04/29/24 8441

## 2024-05-07 ENCOUNTER — APPOINTMENT (OUTPATIENT)
Dept: AUDIOLOGY | Facility: CLINIC | Age: 24
End: 2024-05-07
Payer: COMMERCIAL

## 2024-05-07 ENCOUNTER — CLINICAL SUPPORT (OUTPATIENT)
Dept: AUDIOLOGY | Facility: CLINIC | Age: 24
End: 2024-05-07
Payer: COMMERCIAL

## 2024-05-07 ENCOUNTER — OFFICE VISIT (OUTPATIENT)
Dept: OTOLARYNGOLOGY | Facility: CLINIC | Age: 24
End: 2024-05-07
Payer: COMMERCIAL

## 2024-05-07 VITALS — HEIGHT: 63 IN | WEIGHT: 117.1 LBS | BODY MASS INDEX: 20.75 KG/M2 | TEMPERATURE: 96.8 F

## 2024-05-07 DIAGNOSIS — R42 DIZZINESS: Primary | ICD-10-CM

## 2024-05-07 DIAGNOSIS — R42 LIGHTHEADEDNESS: Primary | ICD-10-CM

## 2024-05-07 PROCEDURE — 92550 TYMPANOMETRY & REFLEX THRESH: CPT | Performed by: AUDIOLOGIST

## 2024-05-07 PROCEDURE — 92557 COMPREHENSIVE HEARING TEST: CPT | Performed by: AUDIOLOGIST

## 2024-05-07 PROCEDURE — 99203 OFFICE O/P NEW LOW 30 MIN: CPT | Performed by: NURSE PRACTITIONER

## 2024-05-07 ASSESSMENT — ENCOUNTER SYMPTOMS
HEADACHES: 1
BACK PAIN: 0
FATIGUE: 1
PALPITATIONS: 0
LIGHT-HEADEDNESS: 1
WEAKNESS: 1
AURA: 0
VERTIGO: 1
BOWEL INCONTINENCE: 0
NECK PAIN: 1
NEAR-SYNCOPE: 1
DIAPHORESIS: 0
FOCAL WEAKNESS: 1
SHORTNESS OF BREATH: 1
FEVER: 0
NAUSEA: 1
ALTERED MENTAL STATUS: 1
VOMITING: 0
CONFUSION: 0
DIZZINESS: 1
NEUROLOGIC COMPLAINT: 1
LOSS OF BALANCE: 1
ABDOMINAL PAIN: 0

## 2024-05-07 ASSESSMENT — PATIENT HEALTH QUESTIONNAIRE - PHQ9
SUM OF ALL RESPONSES TO PHQ9 QUESTIONS 1 AND 2: 0
2. FEELING DOWN, DEPRESSED OR HOPELESS: NOT AT ALL
1. LITTLE INTEREST OR PLEASURE IN DOING THINGS: NOT AT ALL

## 2024-05-07 NOTE — PROGRESS NOTES
Chief Complaint   Patient presents with    Dizziness      HISTORY:  Manuela Rodrigez, age 23 years, was seen for audiogram in conjunction with otolaryngology appointment on 5/7/2024.  Ms. Rodrigez reports onset of dizziness two months ago.  She reports her ears feel clogged. She was seen in the ED and was prescribed meclizine but this did not provide relief. She notes dizziness with head movements.  She notes neck pain and headaches.  There is no report of hearing loss, tinnitus or middle ear pathology.    RESULTS:  Prior to testing both external auditory canals were clear and tympanic membranes visualized    Immittance and acoustic reflexes:  Immittance testing yielded TYPE A tympanograms indicating normal middle ear function both ears  Acoustic reflexes were present 500 - 4000 Hz both ears  Audiogram:  Normal hearing levels were obtained 250 - 8000 Hz both ears  Speech reception thresholds obtained at 5 dBHL both ears  Speech discrimination scores were 100% at  50 dBHL    Distortion product otoacoustic emissions:  Robust emissions were obtained 2000 -8000 Hz both ears    IMPRESSIONS:  Normal middle ear function noted both ears  Normal acoustic reflexes noted both ears  Robust distortion product otoacoustic emissions indicate normal cochlear function both ears  Normal hearing levels     RECOMMENDATIONS:  1.  Follow up with otolaryngology  2.  Retest hearing levels annually    time: 1130 - 1143

## 2024-05-07 NOTE — PROGRESS NOTES
Subjective   Patient ID: Manuela Rodrigez is a 23 y.o. female who presents for Vertigo.  HPI  This patient is referred for evaluation of  episodic non-vertiginous dizziness. The patient is not  accompanied by anyone. The approximate duration of her complaints is 2 months.    The patient describes her episodes of feeling lightheaded dizziness as, anxious, like she will fall over.  Episodes last about 10 minutes and typically occur about 3 times per day  When asked about ear pain, headache, phono-photophobia, visual or motion intolerance, sound or pressure induced symptoms, hearing loss, discharge from ear, tinnitus, aural fullness or autophony, the patient admits to occipital headaches and motion intolerance which is worse when she is the .    When asked about a significant past otological history including history of prior ear surgery, noise exposure, exposure to ototoxic drugs or agents, and/or family history of hearing loss, the patient admits to none.    Review of Systems  A comprehensive or 10 points review of the patient´s constitutional, neurological, HEENT, pulmonary, cardiovascular and genito-urinary systems showed only those mentioned in history of present illness.    Objective   Physical Exam  Constitutional: no fever, chills, weight loss or weight gain   General appearance: Appears well, well-nourished, well groomed. No acute distress.   Communication: Normal communication   Psychiatric: Oriented to person, place and time. Normal mood and affect.   Neurologic: Cranial nerves II-XII grossly intact and symmetric bilaterally.   Head and Face:   Head: Atraumatic with no masses, lesions or scarring.   Face: Normal symmetry, no paralysis, synkinesis or facial tic. No scars or deformities.   TMJ: Normal, no trismus.   Eyes: Conjunctiva not edematous or erythematous   Ears: External inspection of ears with no deformity, scars or masses. Bilateral EACs clear and bilateral TMs intact with no signs of effusions      Neck: Normal appearing, symmetric, trachea midline.   Cardiovascular: Examination of peripheral vascular system shows no clubbing or cyanosis.   Respiratory: No respiratory distress increased work of breathing. Inspection of the chest with symmetric chest expansion and normal respiratory effort.   Skin: No rashes in the head or neck  Bedside occulomotor function assessment for ocular pursuits and saccades, spontaneous nystagmus,  positional and postural testing (Romberg, [place], bilateral head thrust, tandem gait, and Joyce Hallpike) is normal.  My interpretation of the audiogram done today is normal hearing with excellent word recognition scores, normal tympanograms, normal OAE's bilaterally.    Assessment/Plan        This patient presents for initial evaluation of acute acquired lightheadedness.    Reassurance given that otologic exam, audiogram, balance testing today are all normal.  The physiology of balance control was explained. The likely possible etiologies were reviewed. I believe the patient does not have a peripheral vestibular disorder. I recommended referral to cardiology and neurology for lightheadedness.  Patient is in agreement with the plan.  All questions were answered to patient's satisfaction.    This note was created using speech recognition transcription software. Despite proofreading, several typographical errors might be present that might affect the meaning of the content. Please call with any questions.        GRAHAM Garcia-CNP 05/07/24 1:20 PM

## 2024-05-14 ENCOUNTER — APPOINTMENT (OUTPATIENT)
Dept: AUDIOLOGY | Facility: CLINIC | Age: 24
End: 2024-05-14
Payer: COMMERCIAL

## 2024-05-14 ENCOUNTER — APPOINTMENT (OUTPATIENT)
Dept: OTOLARYNGOLOGY | Facility: CLINIC | Age: 24
End: 2024-05-14
Payer: COMMERCIAL

## 2024-05-15 ENCOUNTER — OFFICE VISIT (OUTPATIENT)
Dept: OBSTETRICS AND GYNECOLOGY | Facility: CLINIC | Age: 24
End: 2024-05-15
Payer: COMMERCIAL

## 2024-05-15 VITALS
BODY MASS INDEX: 21.05 KG/M2 | SYSTOLIC BLOOD PRESSURE: 108 MMHG | DIASTOLIC BLOOD PRESSURE: 72 MMHG | WEIGHT: 118.8 LBS | HEIGHT: 63 IN

## 2024-05-15 DIAGNOSIS — Z30.09 ENCOUNTER FOR COUNSELING REGARDING CONTRACEPTION: Primary | ICD-10-CM

## 2024-05-15 PROCEDURE — 99202 OFFICE O/P NEW SF 15 MIN: CPT

## 2024-05-15 ASSESSMENT — ENCOUNTER SYMPTOMS
MUSCULOSKELETAL NEGATIVE: 0
ALLERGIC/IMMUNOLOGIC NEGATIVE: 0
HEMATOLOGIC/LYMPHATIC NEGATIVE: 0
CONSTITUTIONAL NEGATIVE: 0
NEUROLOGICAL NEGATIVE: 0
GASTROINTESTINAL NEGATIVE: 0
RESPIRATORY NEGATIVE: 0
ENDOCRINE NEGATIVE: 0
CARDIOVASCULAR NEGATIVE: 0
PSYCHIATRIC NEGATIVE: 0
EYES NEGATIVE: 0

## 2024-05-15 ASSESSMENT — PAIN SCALES - GENERAL: PAINLEVEL: 0-NO PAIN

## 2024-05-15 NOTE — PROGRESS NOTES
"Subjective   Manuela is a 23 y.o. female who presents for contraception counseling.    Objective   /72 (BP Location: Right arm)   Ht 1.6 m (5' 3\")   Wt 53.9 kg (118 lb 12.8 oz)   LMP  (LMP Unknown)   BMI 21.04 kg/m²   Patient declined physical exam    Assessment/Plan   Risks, benefits, and expected side effects of available hormonal contraception methods were discussed, including IUDs, Nexplanon, Depo-Provera, vaginal ring, contraception patch, COCs, and POPs. Manuela decided on  Nexplanon .    Manuela was seen today for birth control switch.  Diagnoses and all orders for this visit:  Encounter for counseling regarding contraception (Primary)    Patient to schedule appointment for placement of nexplanon.   Encouraged to reach out to our office with any questions or concerns.     GELACIO Aly  "

## 2024-05-16 ENCOUNTER — PROCEDURE VISIT (OUTPATIENT)
Dept: OBSTETRICS AND GYNECOLOGY | Facility: CLINIC | Age: 24
End: 2024-05-16
Payer: COMMERCIAL

## 2024-05-16 VITALS
HEIGHT: 63 IN | SYSTOLIC BLOOD PRESSURE: 119 MMHG | BODY MASS INDEX: 20.38 KG/M2 | WEIGHT: 115 LBS | DIASTOLIC BLOOD PRESSURE: 76 MMHG

## 2024-05-16 DIAGNOSIS — Z30.017 ENCOUNTER FOR INITIAL PRESCRIPTION OF IMPLANTABLE SUBDERMAL CONTRACEPTIVE: ICD-10-CM

## 2024-05-16 LAB — PREGNANCY TEST URINE, POC: NEGATIVE

## 2024-05-16 PROCEDURE — 81025 URINE PREGNANCY TEST: CPT | Performed by: OBSTETRICS & GYNECOLOGY

## 2024-05-16 PROCEDURE — 11981 INSERTION DRUG DLVR IMPLANT: CPT | Performed by: OBSTETRICS & GYNECOLOGY

## 2024-05-16 PROCEDURE — 99214 OFFICE O/P EST MOD 30 MIN: CPT | Performed by: OBSTETRICS & GYNECOLOGY

## 2024-05-16 NOTE — PROGRESS NOTES
Manuela Rodrigez is a 23 y.o. female who presents with a chief complaint of interested in NEXPLANON (Depo given 12/2023 ??/Today UPT is NEGATIVE in office/Pt concerned about haing anxiety with NEXPLANON that she had with depo./)      SUBJECTIVE  Patient presents to discuss contraception.  She was getting the Depo shot and was having a lot of anxiety from it and would like to get an Nexplanon.  She has had 2 Nexplanon's in the past.  She has never had any problems with those.  We talked about the risks and the benefits of both Depo and Nexplanon at length.    Past Medical History:   Diagnosis Date    Other specified health status     No pertinent past medical history     Past Surgical History:   Procedure Laterality Date    OTHER SURGICAL HISTORY  10/17/2019    No history of surgery     Social History     Socioeconomic History    Marital status: Single     Spouse name: None    Number of children: None    Years of education: None    Highest education level: None   Occupational History    None   Tobacco Use    Smoking status: Unknown    Smokeless tobacco: None   Substance and Sexual Activity    Alcohol use: None    Drug use: None    Sexual activity: None   Other Topics Concern    None   Social History Narrative    None     Social Determinants of Health     Financial Resource Strain: Not on file   Food Insecurity: Not on file   Transportation Needs: Not on file   Physical Activity: Not on file   Stress: Not on file   Social Connections: Not on file   Intimate Partner Violence: Not on file   Housing Stability: Not on file     No family history on file.    OB History   No obstetric history on file.       OBJECTIVE  No Known Allergies   (Not in a hospital admission)       Review of Systems  History obtained from the patient  General ROS: negative  Psychological ROS: negative  Gastrointestinal ROS: negative  Musculoskeletal ROS: negative  Physical Exam  General Appearance: awake, alert, oriented, in no acute distress, well  "developed, well nourished, and in no acute distress  Skin: there are no suspicious lesions or rashes of concern, skin color, texture, turgor are normal; there are no bruises, rashes or lesions.  Head/Face: NCAT  Eyes: No gross abnormalities., PERRL, and EOMI  Neck: neck- supple, no mass, non-tender  Back: no pain to palpation  Abdomen: Soft, non-tender, normal bowel sounds; no bruits, organomegaly or masses.  Extremities: Extremities warm to touch, pink, with no edema.  Musculoskeletal: negativePatient ID: Manuela Rodrigez is a 23 y.o. female.    Insertion of Contraceptive Capsule    Date/Time: 5/16/2024 2:08 PM    Performed by: Javad Garcia DO  Authorized by: Javad Garcia DO    Consent:     Consent obtained:  Written    Consent given by:  Patient    Procedural risks discussed:  Bleeding    Patient questions answered: yes      Patient agrees, verbalizes understanding, and wants to proceed: yes      Educational handouts given: yes      Instructions and paperwork completed: yes    Indication:     Indication: Insertion of non-biodegradable drug delivery implant    Pre-procedure:     Pre-procedure timeout performed: yes      Local anesthetic:  Lidocaine without epinephrine    The site was cleaned and prepped in a sterile fashion: yes    Procedure:     Procedure:  Insertion    Left/right:  Left    Preloaded contraceptive capsule trocar was placed subdermally: yes      Visualization of implant was obtained: yes      Contraceptive capsule was inserted and trocar removed: yes      Visualization of notch in stylet and palpation of device: yes      Palpation confirms placement by provider and patient: yes      Site was closed with steri-strips and pressure bandage applied: yes        /76   Ht 1.6 m (5' 3\")   Wt 52.2 kg (115 lb)   BMI 20.37 kg/m²    Problem List Items Addressed This Visit    None  Visit Diagnoses       Encounter for initial prescription of implantable subdermal contraceptive        Relevant Orders "    POCT pregnancy, urine manually resulted (Completed)

## 2024-05-21 ENCOUNTER — HOSPITAL ENCOUNTER (OUTPATIENT)
Dept: CARDIOLOGY | Facility: HOSPITAL | Age: 24
Discharge: HOME | End: 2024-05-21
Payer: COMMERCIAL

## 2024-05-21 ENCOUNTER — OFFICE VISIT (OUTPATIENT)
Dept: CARDIOLOGY | Facility: HOSPITAL | Age: 24
End: 2024-05-21
Payer: COMMERCIAL

## 2024-05-21 VITALS
DIASTOLIC BLOOD PRESSURE: 71 MMHG | BODY MASS INDEX: 20.38 KG/M2 | HEART RATE: 75 BPM | SYSTOLIC BLOOD PRESSURE: 101 MMHG | WEIGHT: 115 LBS | HEIGHT: 63 IN | OXYGEN SATURATION: 98 %

## 2024-05-21 DIAGNOSIS — R42 LIGHTHEADEDNESS: ICD-10-CM

## 2024-05-21 PROCEDURE — 93225 XTRNL ECG REC<48 HRS REC: CPT

## 2024-05-21 PROCEDURE — 99214 OFFICE O/P EST MOD 30 MIN: CPT | Performed by: INTERNAL MEDICINE

## 2024-05-21 PROCEDURE — 93227 XTRNL ECG REC<48 HR R&I: CPT | Performed by: INTERNAL MEDICINE

## 2024-05-21 ASSESSMENT — PATIENT HEALTH QUESTIONNAIRE - PHQ9
2. FEELING DOWN, DEPRESSED OR HOPELESS: NOT AT ALL
1. LITTLE INTEREST OR PLEASURE IN DOING THINGS: NOT AT ALL
SUM OF ALL RESPONSES TO PHQ9 QUESTIONS 1 AND 2: 0

## 2024-05-21 ASSESSMENT — ENCOUNTER SYMPTOMS
DEPRESSION: 0
LOSS OF SENSATION IN FEET: 0
OCCASIONAL FEELINGS OF UNSTEADINESS: 0

## 2024-05-21 ASSESSMENT — PAIN SCALES - GENERAL: PAINLEVEL: 0-NO PAIN

## 2024-05-21 NOTE — PROGRESS NOTES
"Subjective   Manuela Rodrigez is a 23 y.o. female presenting for review of palpitations and dizziness.    Investigations:  ECG (4/2024) - sinus rhythm with sinus arrhythmia.     Chief Complaint:  Dizziness    HPI  Ms Rodrigez is a 23 YOF with no significant cardiac history who was referred from ENT to cardiology clinic for lightheadedness.     She reports that for the past 2 months she has been having intermittent episodes (few per wk) of lightheadedness associated with anxiety, chest tightness and feeling \"jittery\". Each episode would last for few mins then spontaneously resolve. She thinks it maybe related to her Depo contraceptive injections and she felt better after it was stopped. She denies any exertional symptoms. No family hx of heart disease.     Social hx   Smoking: Denies  Alcohol: Denies  Illicit drugs: Denies    ROS  A 10-system review was performed and was unremarkable apart from what is presented in the HPI.     Objective   Physical Exam  Gen: awake and alert, AAOx3  HEENT: normocephalic.  CV: RRR. No murmurs, gallops, rubs. No JVD.   Pulm: clear to auscultation bilaterally. No coarse lung sounds  Abd: soft, non-distended. Normal active bowel sounds  Ext: warm and well-perfused. No LE edema  Psych: appropriate affect  Neuro: grossly intact sensation and motor functions.      Lab Review:   Lab Results   Component Value Date     04/29/2024    K 3.6 04/29/2024     04/29/2024    CO2 22 04/29/2024    BUN 6 04/29/2024    CREATININE 0.71 04/29/2024    GLUCOSE 90 04/29/2024    CALCIUM 9.0 04/29/2024     No results found for: \"CKTOTAL\", \"CKMB\", \"CKMBINDEX\", \"TROPONINI\"  Lab Results   Component Value Date    WBC 3.8 (L) 04/29/2024    HGB 12.4 04/29/2024    HCT 34.7 (L) 04/29/2024    MCV 88 04/29/2024     04/29/2024       Assessment/Plan   The encounter diagnosis was Lightheadedness.  Ms Rodrigez is a 23 YOF with no significant cardiac history who was referred from ENT to cardiology clinic for " lightheadedness and palpitations.     The palpitations carrying a broad differential with possible etiologies including arrhythmia, medication side effect (she attributes it to Depo injections), relative hypotension, neurologic or psychologic causes (?anxiety, ?panic attacks.   - She was referred to neurology by ENT.   - We will arrange a Holter monitor to assess her palpitations and dizziness. I will follow-up with Manuela after her investigations.

## 2024-05-21 NOTE — PATIENT INSTRUCTIONS
It was nice to see you today Ms Rodrigez    To further workup your lightheadedness, we will do a heart monitor for few days to look for any arrhythmia.

## 2024-05-23 ENCOUNTER — LAB (OUTPATIENT)
Dept: LAB | Facility: LAB | Age: 24
End: 2024-05-23
Payer: COMMERCIAL

## 2024-05-23 ENCOUNTER — OFFICE VISIT (OUTPATIENT)
Dept: PRIMARY CARE | Facility: CLINIC | Age: 24
End: 2024-05-23
Payer: COMMERCIAL

## 2024-05-23 VITALS
BODY MASS INDEX: 20.8 KG/M2 | TEMPERATURE: 97.1 F | OXYGEN SATURATION: 99 % | HEART RATE: 85 BPM | DIASTOLIC BLOOD PRESSURE: 78 MMHG | HEIGHT: 63 IN | WEIGHT: 117.4 LBS | SYSTOLIC BLOOD PRESSURE: 118 MMHG

## 2024-05-23 DIAGNOSIS — F41.1 GENERALIZED ANXIETY DISORDER: ICD-10-CM

## 2024-05-23 DIAGNOSIS — R42 LIGHTHEADEDNESS: ICD-10-CM

## 2024-05-23 DIAGNOSIS — Z00.00 ANNUAL PHYSICAL EXAM: Primary | ICD-10-CM

## 2024-05-23 DIAGNOSIS — Z13.1 DIABETES MELLITUS SCREENING: ICD-10-CM

## 2024-05-23 DIAGNOSIS — E55.9 VITAMIN D DEFICIENCY: ICD-10-CM

## 2024-05-23 DIAGNOSIS — Z00.00 ANNUAL PHYSICAL EXAM: ICD-10-CM

## 2024-05-23 DIAGNOSIS — Z13.6 SCREENING FOR CARDIOVASCULAR CONDITION: ICD-10-CM

## 2024-05-23 DIAGNOSIS — D72.819 LEUKOPENIA, UNSPECIFIED TYPE: ICD-10-CM

## 2024-05-23 LAB
25(OH)D3 SERPL-MCNC: 13 NG/ML (ref 31–100)
ALBUMIN SERPL-MCNC: 4.3 G/DL (ref 3.5–5)
ALP BLD-CCNC: 95 U/L (ref 35–125)
ALT SERPL-CCNC: 25 U/L (ref 5–40)
ANION GAP SERPL CALC-SCNC: 12 MMOL/L
AST SERPL-CCNC: 18 U/L (ref 5–40)
BASOPHILS # BLD AUTO: 0.02 X10*3/UL (ref 0–0.1)
BASOPHILS NFR BLD AUTO: 0.4 %
BILIRUB SERPL-MCNC: <0.2 MG/DL (ref 0.1–1.2)
BUN SERPL-MCNC: 8 MG/DL (ref 8–25)
CALCIUM SERPL-MCNC: 9 MG/DL (ref 8.5–10.4)
CHLORIDE SERPL-SCNC: 104 MMOL/L (ref 97–107)
CHOLEST SERPL-MCNC: 154 MG/DL (ref 133–200)
CHOLEST/HDLC SERPL: 2.6 {RATIO}
CO2 SERPL-SCNC: 22 MMOL/L (ref 24–31)
CREAT SERPL-MCNC: 0.8 MG/DL (ref 0.4–1.6)
EGFRCR SERPLBLD CKD-EPI 2021: >90 ML/MIN/1.73M*2
EOSINOPHIL # BLD AUTO: 0.06 X10*3/UL (ref 0–0.7)
EOSINOPHIL NFR BLD AUTO: 1.3 %
ERYTHROCYTE [DISTWIDTH] IN BLOOD BY AUTOMATED COUNT: 11.9 % (ref 11.5–14.5)
EST. AVERAGE GLUCOSE BLD GHB EST-MCNC: 103 MG/DL
GLUCOSE SERPL-MCNC: 83 MG/DL (ref 65–99)
HBA1C MFR BLD: 5.2 %
HCT VFR BLD AUTO: 38.8 % (ref 36–46)
HCV AB SER QL: NONREACTIVE
HDLC SERPL-MCNC: 60 MG/DL
HGB BLD-MCNC: 12.8 G/DL (ref 12–16)
IMM GRANULOCYTES # BLD AUTO: 0 X10*3/UL (ref 0–0.7)
IMM GRANULOCYTES NFR BLD AUTO: 0 % (ref 0–0.9)
LDLC SERPL CALC-MCNC: 84 MG/DL (ref 65–130)
LYMPHOCYTES # BLD AUTO: 1.88 X10*3/UL (ref 1.2–4.8)
LYMPHOCYTES NFR BLD AUTO: 39.8 %
MCH RBC QN AUTO: 31.1 PG (ref 26–34)
MCHC RBC AUTO-ENTMCNC: 33 G/DL (ref 32–36)
MCV RBC AUTO: 94 FL (ref 80–100)
MONOCYTES # BLD AUTO: 0.38 X10*3/UL (ref 0.1–1)
MONOCYTES NFR BLD AUTO: 8.1 %
NEUTROPHILS # BLD AUTO: 2.38 X10*3/UL (ref 1.2–7.7)
NEUTROPHILS NFR BLD AUTO: 50.4 %
NRBC BLD-RTO: 0 /100 WBCS (ref 0–0)
PLATELET # BLD AUTO: 273 X10*3/UL (ref 150–450)
POTASSIUM SERPL-SCNC: 3.9 MMOL/L (ref 3.4–5.1)
PROT SERPL-MCNC: 7.7 G/DL (ref 5.9–7.9)
RBC # BLD AUTO: 4.12 X10*6/UL (ref 4–5.2)
SODIUM SERPL-SCNC: 138 MMOL/L (ref 133–145)
TRIGL SERPL-MCNC: 52 MG/DL (ref 40–150)
WBC # BLD AUTO: 4.7 X10*3/UL (ref 4.4–11.3)

## 2024-05-23 PROCEDURE — 80061 LIPID PANEL: CPT

## 2024-05-23 PROCEDURE — 86803 HEPATITIS C AB TEST: CPT

## 2024-05-23 PROCEDURE — 80053 COMPREHEN METABOLIC PANEL: CPT

## 2024-05-23 PROCEDURE — 99204 OFFICE O/P NEW MOD 45 MIN: CPT | Performed by: STUDENT IN AN ORGANIZED HEALTH CARE EDUCATION/TRAINING PROGRAM

## 2024-05-23 PROCEDURE — 82306 VITAMIN D 25 HYDROXY: CPT

## 2024-05-23 PROCEDURE — 36415 COLL VENOUS BLD VENIPUNCTURE: CPT

## 2024-05-23 PROCEDURE — 85025 COMPLETE CBC W/AUTO DIFF WBC: CPT

## 2024-05-23 PROCEDURE — 83036 HEMOGLOBIN GLYCOSYLATED A1C: CPT

## 2024-05-23 PROCEDURE — 99385 PREV VISIT NEW AGE 18-39: CPT | Performed by: STUDENT IN AN ORGANIZED HEALTH CARE EDUCATION/TRAINING PROGRAM

## 2024-05-23 RX ORDER — SERTRALINE HYDROCHLORIDE 50 MG/1
50 TABLET, FILM COATED ORAL DAILY
Qty: 30 TABLET | Refills: 1 | Status: SHIPPED | OUTPATIENT
Start: 2024-05-23 | End: 2024-07-22

## 2024-05-23 ASSESSMENT — ANXIETY QUESTIONNAIRES
3. WORRYING TOO MUCH ABOUT DIFFERENT THINGS: NEARLY EVERY DAY
4. TROUBLE RELAXING: NEARLY EVERY DAY
GAD7 TOTAL SCORE: 21
6. BECOMING EASILY ANNOYED OR IRRITABLE: NEARLY EVERY DAY
1. FEELING NERVOUS, ANXIOUS, OR ON EDGE: NEARLY EVERY DAY
5. BEING SO RESTLESS THAT IT IS HARD TO SIT STILL: NEARLY EVERY DAY
7. FEELING AFRAID AS IF SOMETHING AWFUL MIGHT HAPPEN: NEARLY EVERY DAY
2. NOT BEING ABLE TO STOP OR CONTROL WORRYING: NEARLY EVERY DAY
IF YOU CHECKED OFF ANY PROBLEMS ON THIS QUESTIONNAIRE, HOW DIFFICULT HAVE THESE PROBLEMS MADE IT FOR YOU TO DO YOUR WORK, TAKE CARE OF THINGS AT HOME, OR GET ALONG WITH OTHER PEOPLE: SOMEWHAT DIFFICULT

## 2024-05-23 ASSESSMENT — PAIN SCALES - GENERAL: PAINLEVEL: 0-NO PAIN

## 2024-05-23 ASSESSMENT — PATIENT HEALTH QUESTIONNAIRE - PHQ9
1. LITTLE INTEREST OR PLEASURE IN DOING THINGS: NOT AT ALL
10. IF YOU CHECKED OFF ANY PROBLEMS, HOW DIFFICULT HAVE THESE PROBLEMS MADE IT FOR YOU TO DO YOUR WORK, TAKE CARE OF THINGS AT HOME, OR GET ALONG WITH OTHER PEOPLE: SOMEWHAT DIFFICULT
2. FEELING DOWN, DEPRESSED OR HOPELESS: SEVERAL DAYS
SUM OF ALL RESPONSES TO PHQ9 QUESTIONS 1 AND 2: 1

## 2024-05-23 NOTE — PATIENT INSTRUCTIONS
It was a pleasure to meet you today.    Go to the lab for fasting blood work, we will call you with all results.    New anxiety medication, sertraline, sent to pharmacy.  Take daily as prescribed.    Follow-up with me in 1 month for medication/anxiety review, sooner if needed.

## 2024-05-23 NOTE — PROGRESS NOTES
Subjective   Manuela Rodrigez is a 23 y.o. female who presents for Annual Exam.    HPI:      PREVENTATIVE HEALTH CARE:    Immunizations:  COVID:  DUE  TDaP:  utd  Pneumonia:  not currently indicated    Immunization History   Administered Date(s) Administered    DTP 10/12/2016    DTaP vaccine, pediatric  (INFANRIX) 2002, 2002, 01/02/2003, 09/18/2003, 07/12/2006    HPV 9-valent vaccine (GARDASIL 9) 06/18/2020    Hepatitis B vaccine, pediatric/adolescent (RECOMBIVAX, ENGERIX) 2002, 2002, 01/02/2003    HiB PRP-T conjugate vaccine (HIBERIX, ACTHIB) 2002, 2002, 01/02/2003, 09/18/2003    Influenza, seasonal, injectable 12/19/2003, 01/22/2004    MMR vaccine, subcutaneous (MMR II) 07/24/2003, 07/12/2006    Meningococcal ACWY, unspecified 10/12/2016    Meningococcal ACWY-D (Menactra) 4-valent conjugate vaccine 10/10/2018    Pfizer Purple Cap SARS-CoV-2 09/02/2021, 09/24/2021    Pneumococcal Conjugate PCV 7 07/24/2003    Pneumococcal conjugate vaccine, 13-valent (PREVNAR 13) 2002, 2002, 09/18/2003    Poliovirus vaccine, subcutaneous (IPOL) 2002, 2002, 01/02/2003, 07/12/2006    Varicella vaccine, subcutaneous (VARIVAX) 07/24/2003, 10/04/2007       Screenings:  HIV:  negative 10/17/2019  Pap:  7/24/2023 wnl - utd  Mammogram: Not currently indicated  Colonoscopy: Not currently indicated      Recent Nexplanon Insertion:  None with OB/GYN on 5/16/2024.  Previously had Nexplanon.  Then depo, last injection 12/29/2024.     Lightheadedness:  Intermittent. Recent visit with cardiologist Dr. Grullon 5/21/2024, referred from ENT.  holter monitor ordered.  Told it could be anxiety related, never had anxiety until she started getting depo shot.  Lasts 5-10 minutes.    Generalized  Interested in medication over therapy. Has never taken medication for this issue before    5/23/2024      1441 Last Filed Value   Over the last 2 weeks, how often have you been bothered by any of the  "following problems?      Feeling nervous, anxious, or on edge Nearly every day Nearly every day   Not being able to stop or control worrying Nearly every day Nearly every day   Worrying too much about different things Nearly every day Nearly every day   Trouble relaxing Nearly every day Nearly every day   Being so restless that it is hard to sit still Nearly every day Nearly every day   Becoming easily annoyed or irritable Nearly every day Nearly every day   Feeling afraid as if something awful might happen Nearly every day Nearly every day   ALAN-7 Total Score 21 21   If you checked off any problems on this questionnaire,      How difficult have these problems made it for you to do your work, take care of things at home, or get along with other people? Somewhat difficult          ROS:    Review of systems is essentially negative for all systems except for any identified issues in HPI above.    Objective     /78   Pulse 85   Temp 36.2 °C (97.1 °F)   Ht 1.6 m (5' 3\")   Wt 53.3 kg (117 lb 6.4 oz)   LMP  (LMP Unknown)   SpO2 99%   BMI 20.80 kg/m²      PHYSICAL EXAM    GENERAL  Well-appearing, pleasant and cooperative.  No acute distress.    HEENT  HEAD:   Normocephalic.  Atraumatic.  EYES:  PERRLA.  No scleral icterus or conjunctival injection.  EARS:  Tympanic membranes visualized bilaterally without erythema, fluid, or bulging.  NECK:  No adenopathy.  No palpable thyroid enlargement or nodules.    THROAT:  Moist oropharynx without tonsillar enlargement or exudates.    LUNGS:    Clear to auscultation bilaterally.  No wheezes, rales, rhonchi.    CARDIAC:  Regular rate and rhythm.  Normal S1S2.  No murmurs/rubs/gallops.    ABDOMEN:  Soft, non-tender, non-distended.  No hepatosplenomegaly.  Normoactive bowel sounds.    MUSCULOSKELETAL:  No gross abnormalities.   No joint swelling or erythema,.  No spinal or paraspinal tenderness to palpation.    EXTREMITIES:  No LE edema or cyanosis.      NEURO           " Alert and oriented x3. No focal deficits.    PSYCH:          Affect appropriate.           Assessment/Plan   Problem List Items Addressed This Visit    None  Visit Diagnoses       Generalized anxiety disorder    -  Primary    Relevant Medications    sertraline (Zoloft) 50 mg tablet    Vitamin D deficiency        Relevant Orders    Vitamin D 25-Hydroxy,Total (for eval of Vitamin D levels) (Completed)    Annual physical exam        Relevant Orders    Lipid Panel (Completed)    Hepatitis C Antibody (Completed)    Comprehensive Metabolic Panel (Completed)    Hemoglobin A1c (Completed)    Screening for cardiovascular condition        Relevant Orders    Lipid Panel (Completed)    Diabetes mellitus screening        Relevant Orders    Hemoglobin A1c (Completed)    Lightheadedness        Relevant Orders    Comprehensive Metabolic Panel (Completed)    Hemoglobin A1c (Completed)    CBC and Auto Differential (Completed)    Leukopenia, unspecified type        Relevant Orders    CBC and Auto Differential (Completed)          Counseling:   Medication education:    Education: The patient is counseled regarding potential side effects of all new medications.    Understanding:  Patient expressed understanding.    Adherence:  No barriers to adherence identified.         Radha Villa MD

## 2024-05-24 ENCOUNTER — PATIENT MESSAGE (OUTPATIENT)
Dept: PRIMARY CARE | Facility: CLINIC | Age: 24
End: 2024-05-24
Payer: COMMERCIAL

## 2024-05-24 ENCOUNTER — TELEPHONE (OUTPATIENT)
Dept: PRIMARY CARE | Facility: CLINIC | Age: 24
End: 2024-05-24
Payer: COMMERCIAL

## 2024-05-24 DIAGNOSIS — E55.9 VITAMIN D DEFICIENCY: Primary | ICD-10-CM

## 2024-05-24 RX ORDER — ERGOCALCIFEROL 1.25 MG/1
50000 CAPSULE ORAL
Qty: 12 CAPSULE | Refills: 0 | Status: SHIPPED | OUTPATIENT
Start: 2024-05-24 | End: 2024-08-16

## 2024-05-24 NOTE — TELEPHONE ENCOUNTER
Vitamin D liquid sent to pharmacy.  See MyChart encounter includes patient notification message and rx order.

## 2024-05-28 ENCOUNTER — APPOINTMENT (OUTPATIENT)
Dept: PRIMARY CARE | Facility: CLINIC | Age: 24
End: 2024-05-28
Payer: COMMERCIAL

## 2024-05-29 ENCOUNTER — APPOINTMENT (OUTPATIENT)
Dept: OBSTETRICS AND GYNECOLOGY | Facility: CLINIC | Age: 24
End: 2024-05-29
Payer: COMMERCIAL

## 2024-05-31 LAB — BODY SURFACE AREA: 1.52 M2

## 2024-07-18 DIAGNOSIS — F41.9 ANXIETY: ICD-10-CM

## 2024-07-18 NOTE — TELEPHONE ENCOUNTER
PT was seen at [facility]-unsure of date, thinks about 2 weeks ago. PT was given RX Hydroxyzine. PT stating that she is almost out of medication and requesting a refill. PT scheduled for appointment 7/25/2024. Please advise.    Zora in [address]

## 2024-07-18 NOTE — TELEPHONE ENCOUNTER
I can give a short-term refill until her upcoming appointment.  Hydroxyzine not on her current medication list.  Please verify dosing, preferred pharmacy, and pend refill request.

## 2024-07-19 RX ORDER — HYDROXYZINE HYDROCHLORIDE 25 MG/1
25 TABLET, FILM COATED ORAL EVERY 6 HOURS PRN
Qty: 120 TABLET | Refills: 0 | Status: SHIPPED | OUTPATIENT
Start: 2024-07-19

## 2024-07-19 RX ORDER — HYDROXYZINE HYDROCHLORIDE 25 MG/1
25 TABLET, FILM COATED ORAL EVERY 6 HOURS PRN
COMMUNITY
Start: 2024-07-06 | End: 2024-07-19 | Stop reason: SDUPTHER

## 2024-07-19 NOTE — TELEPHONE ENCOUNTER
Pt is calling back because she stated that she gave the wrong mg for RX Hydroxyzine, stating that she gave 10 mg and that it is the 25 mg as stated in the chart

## 2024-07-23 ENCOUNTER — APPOINTMENT (OUTPATIENT)
Dept: CARDIOLOGY | Facility: HOSPITAL | Age: 24
End: 2024-07-23
Payer: COMMERCIAL

## 2024-08-30 ENCOUNTER — APPOINTMENT (OUTPATIENT)
Dept: OBSTETRICS AND GYNECOLOGY | Facility: CLINIC | Age: 24
End: 2024-08-30
Payer: COMMERCIAL

## 2024-09-19 ENCOUNTER — OFFICE VISIT (OUTPATIENT)
Dept: PRIMARY CARE | Facility: CLINIC | Age: 24
End: 2024-09-19
Payer: COMMERCIAL

## 2024-09-19 VITALS
OXYGEN SATURATION: 99 % | BODY MASS INDEX: 21.26 KG/M2 | HEIGHT: 63 IN | HEART RATE: 73 BPM | SYSTOLIC BLOOD PRESSURE: 120 MMHG | WEIGHT: 120 LBS | TEMPERATURE: 97.2 F | DIASTOLIC BLOOD PRESSURE: 80 MMHG

## 2024-09-19 DIAGNOSIS — Z30.46 NEXPLANON REMOVAL: Primary | ICD-10-CM

## 2024-09-19 PROCEDURE — 11982 REMOVE DRUG IMPLANT DEVICE: CPT | Performed by: STUDENT IN AN ORGANIZED HEALTH CARE EDUCATION/TRAINING PROGRAM

## 2024-09-19 PROCEDURE — 3008F BODY MASS INDEX DOCD: CPT | Performed by: STUDENT IN AN ORGANIZED HEALTH CARE EDUCATION/TRAINING PROGRAM

## 2024-09-19 ASSESSMENT — PAIN SCALES - GENERAL: PAINLEVEL: 0-NO PAIN

## 2024-09-19 ASSESSMENT — PATIENT HEALTH QUESTIONNAIRE - PHQ9
1. LITTLE INTEREST OR PLEASURE IN DOING THINGS: NOT AT ALL
SUM OF ALL RESPONSES TO PHQ9 QUESTIONS 1 AND 2: 0
2. FEELING DOWN, DEPRESSED OR HOPELESS: NOT AT ALL

## 2024-09-19 NOTE — PATIENT INSTRUCTIONS
Nexplanon removed today.    The area where your implant was removed from may feel numb for 2-3 hours after the removal. If you experience any pain, you may take painkillers e.g. Paracetamol as instructed by the .    Keep the pressure dressing on for 24 hours.    Keep the wound area clean and dry for the next week.  It is okay to shower and wash with soap and water, but avoid completely submerging your arm.  You may notice some bruising on your arm when you remove the dressing. It will clear up in a few weeks’ time.     If you notice any swelling, redness, or weeping from the implant site, please contact our office.    Your fertility will return to normal straight away unless you are using an alternative hormonal method of contraception.

## 2024-09-19 NOTE — PROGRESS NOTES
"Subjective   Manuela Rodrigez is a 24 y.o. female who presents for nexplanon removal.    HPI:      Abnormal bleeding since Nexplanon inserted earlier this year.  Reviewed alternative treatment options to removal but pt prefers to have Nexplanon removed and avoid alternative contraception for now.    ROS:   Review of systems is essentially negative for all systems except for any identified issues in HPI above.    Objective     /80   Pulse 73   Temp 36.2 °C (97.2 °F)   Ht 1.6 m (5' 3\")   Wt 54.4 kg (120 lb)   SpO2 99%   BMI 21.26 kg/m²      PHYSICAL EXAM    GENERAL  Well-appearing, pleasant and cooperative.  No acute distress.    HEENT  HEAD:   Normocephalic.  Atraumatic.  EYES:  No scleral icterus or conjunctival injection.    LUNGS:    Clear to auscultation bilaterally.  No wheezes, rales, rhonchi.    CARDIAC:  Regular rate and rhythm.  Normal S1S2.  No murmurs/rubs/gallops.    ABDOMEN:  Soft, non-tender, non-distended.      MUSCULOSKELETAL:  No gross abnormalities.   Nexplanon palpable in LUE.     EXTREMITIES:  No LE edema or cyanosis.      NEURO           Alert and oriented x3. No focal deficits.    PSYCH:          Affect appropriate.         Nexplanon Removal Procedure Note    Location:  Left Arm     Consent obtained. A time-out was performed prior to  initiating procedure to be sure of right patient and right location. Prior to procedure device was easily palpated in the LEFT arm.  The  area surrounding the Nexplanon was prepared with Choloraprep and draped  in the usual sterile manner. The site was anesthetized with 2 ml of 1%  lidocaine with epi.  A skin incision was made over the distal aspect of the device. The  capsule lysed sharply and the device removed using a hemostat.  Hemostasis was assured. The site was dressed with a bandage and a pressure dressing. The patient tolerated the procedure  well.     Followup: The patient tolerated the procedure well without  complications. Standard " post-procedure care is explained and return  precautions are given. Contraception is advised until conception is  desired.        Assessment/Plan   Problem List Items Addressed This Visit       Nexplanon removal - Primary       Time Spent  Prep time on day of patient encounter: 5 minutes  Time spent directly with patient, family or caregiver: 20 minutes  Additional Time Spent on Patient Care Activities: 3 minutes  Documentation Time: 5 minutes  Other Time Spent: 0 minutes  Total: 33 minutes        Radha Villa MD

## 2024-12-10 ENCOUNTER — OFFICE VISIT (OUTPATIENT)
Dept: OBSTETRICS AND GYNECOLOGY | Facility: CLINIC | Age: 24
End: 2024-12-10
Payer: COMMERCIAL

## 2024-12-10 VITALS
BODY MASS INDEX: 22.63 KG/M2 | DIASTOLIC BLOOD PRESSURE: 62 MMHG | HEIGHT: 62 IN | SYSTOLIC BLOOD PRESSURE: 100 MMHG | WEIGHT: 123 LBS

## 2024-12-10 DIAGNOSIS — Z30.09 COUNSELING FOR INITIATION OF BIRTH CONTROL METHOD: Primary | ICD-10-CM

## 2024-12-10 PROCEDURE — 96372 THER/PROPH/DIAG INJ SC/IM: CPT | Performed by: ADVANCED PRACTICE MIDWIFE

## 2024-12-10 PROCEDURE — 3008F BODY MASS INDEX DOCD: CPT | Performed by: ADVANCED PRACTICE MIDWIFE

## 2024-12-10 PROCEDURE — 99213 OFFICE O/P EST LOW 20 MIN: CPT | Performed by: ADVANCED PRACTICE MIDWIFE

## 2024-12-10 RX ORDER — MEDROXYPROGESTERONE ACETATE 150 MG/ML
150 INJECTION, SUSPENSION INTRAMUSCULAR
Status: SHIPPED | OUTPATIENT
Start: 2024-12-10 | End: 2025-12-05

## 2024-12-10 ASSESSMENT — ENCOUNTER SYMPTOMS
ALLERGIC/IMMUNOLOGIC NEGATIVE: 0
HEMATOLOGIC/LYMPHATIC NEGATIVE: 0
CARDIOVASCULAR NEGATIVE: 0
EYES NEGATIVE: 0
PSYCHIATRIC NEGATIVE: 0
ENDOCRINE NEGATIVE: 0
MUSCULOSKELETAL NEGATIVE: 0
GASTROINTESTINAL NEGATIVE: 0
NEUROLOGICAL NEGATIVE: 0
CONSTITUTIONAL NEGATIVE: 0
RESPIRATORY NEGATIVE: 0

## 2024-12-10 NOTE — PROGRESS NOTES
"Assessment/Plan   Problem List Items Addressed This Visit    None  Visit Diagnoses         Codes    Counseling for initiation of birth control method    -  Primary Z30.09    Relevant Medications    medroxyPROGESTERone (Depo-Provera) injection 150 mg (Start on 12/10/2024  3:30 PM)            UPT negative.  BUM x 7 days  Common discomforts and bleeding profile reviewed with patient, as well as possible delayed return of fertility after Depo discontinuation.   Warning s/s reviewed.  Return in 10-12 weeks for RN visit for next injection.  Pregnancy test to be repeated at that time.    Nora Smith, GRAHAM-CNM    Subjective   Manuela Rodrigez is a 24 y.o. female who presents for depo restart.    Sexual Activity: sexually active, male partners; Patient reports 1 partners in the last 12 months.    Pertinent past medical history: none.    No LMP recorded. 11/13/2024    Objective   /62   Ht 1.575 m (5' 2\")   Wt 55.8 kg (123 lb)   BMI 22.50 kg/m²     PHYSICAL EXAM  A&O x 3  Calm and Cooperative  Skin warm and dry, color within normal limits  Respirations unlabored      Lab Review  No results found for: \"HCGURINE\"      Reviewed importance of safe sex practices.  Rto for next injection per depo calendar    "

## 2025-02-18 ENCOUNTER — OFFICE VISIT (OUTPATIENT)
Dept: OBSTETRICS AND GYNECOLOGY | Facility: CLINIC | Age: 25
End: 2025-02-18
Payer: COMMERCIAL

## 2025-02-18 DIAGNOSIS — Z30.42 ENCOUNTER FOR MANAGEMENT AND INJECTION OF DEPO-PROVERA: ICD-10-CM

## 2025-02-18 PROCEDURE — 96372 THER/PROPH/DIAG INJ SC/IM: CPT | Performed by: ADVANCED PRACTICE MIDWIFE

## 2025-02-18 RX ADMIN — MEDROXYPROGESTERONE ACETATE 150 MG: 150 INJECTION, SUSPENSION INTRAMUSCULAR at 14:29

## 2025-02-18 NOTE — PROGRESS NOTES
Pt verified by   Here in office for Depo-Provera, last dose 12/10/24  Depo-Provera supplied by office stock, administered to left deltoid tolerated well  Next dose due 25 to 25, no further questions at this time  Upt- declined      NDC: [id number]  LOT: [id number]  EXP: 3/26

## 2025-02-25 ENCOUNTER — APPOINTMENT (OUTPATIENT)
Dept: OBSTETRICS AND GYNECOLOGY | Facility: CLINIC | Age: 25
End: 2025-02-25
Payer: COMMERCIAL

## 2025-05-13 ENCOUNTER — APPOINTMENT (OUTPATIENT)
Dept: OBSTETRICS AND GYNECOLOGY | Facility: CLINIC | Age: 25
End: 2025-05-13
Payer: COMMERCIAL

## 2025-05-14 ENCOUNTER — APPOINTMENT (OUTPATIENT)
Dept: OBSTETRICS AND GYNECOLOGY | Facility: CLINIC | Age: 25
End: 2025-05-14
Payer: COMMERCIAL

## 2025-05-14 ENCOUNTER — OFFICE VISIT (OUTPATIENT)
Dept: OBSTETRICS AND GYNECOLOGY | Facility: CLINIC | Age: 25
End: 2025-05-14
Payer: COMMERCIAL

## 2025-05-14 DIAGNOSIS — Z30.42 ENCOUNTER FOR SURVEILLANCE OF INJECTABLE CONTRACEPTIVE: ICD-10-CM

## 2025-05-14 PROCEDURE — 96372 THER/PROPH/DIAG INJ SC/IM: CPT | Performed by: ADVANCED PRACTICE MIDWIFE

## 2025-05-14 RX ORDER — MEDROXYPROGESTERONE ACETATE 150 MG/ML
150 INJECTION, SUSPENSION INTRAMUSCULAR
Qty: 1 ML | Refills: 3 | Status: SHIPPED | OUTPATIENT
Start: 2025-05-14

## 2025-05-14 RX ORDER — MEDROXYPROGESTERONE ACETATE 150 MG/ML
150 INJECTION, SUSPENSION INTRAMUSCULAR
Qty: 1 ML | Refills: 3 | Status: CANCELLED | OUTPATIENT
Start: 2025-05-14

## 2025-05-14 RX ADMIN — MEDROXYPROGESTERONE ACETATE 150 MG: 150 INJECTION, SUSPENSION INTRAMUSCULAR at 09:21

## 2025-05-14 NOTE — PROGRESS NOTES
Last Depo- 02/18/2025  Next Depo due- 7/30-8/13/2025  Last SHAHEED- 12/10/2024  UPT offered-declined  LMP-none  Complaints with being on Depo Provera-none  Pt given Depo without difficulties into-Left  upper arm  Pt tolerated injection well     Supplied Depo Provera from office stock  Lot#: [id number]  EXP 09/2027

## 2025-08-14 ENCOUNTER — HOSPITAL ENCOUNTER (EMERGENCY)
Facility: HOSPITAL | Age: 25
Discharge: HOME | End: 2025-08-15
Attending: EMERGENCY MEDICINE
Payer: COMMERCIAL

## 2025-08-14 DIAGNOSIS — R07.9 CHEST PAIN, UNSPECIFIED TYPE: Primary | ICD-10-CM

## 2025-08-14 DIAGNOSIS — R53.83 OTHER FATIGUE: ICD-10-CM

## 2025-08-14 LAB
BASOPHILS # BLD AUTO: 0.02 X10*3/UL (ref 0–0.1)
BASOPHILS NFR BLD AUTO: 0.4 %
EOSINOPHIL # BLD AUTO: 0.02 X10*3/UL (ref 0–0.7)
EOSINOPHIL NFR BLD AUTO: 0.4 %
ERYTHROCYTE [DISTWIDTH] IN BLOOD BY AUTOMATED COUNT: 12.3 % (ref 11.5–14.5)
HCT VFR BLD AUTO: 37.5 % (ref 36–46)
HGB BLD-MCNC: 12.8 G/DL (ref 12–16)
IMM GRANULOCYTES # BLD AUTO: 0.01 X10*3/UL (ref 0–0.7)
IMM GRANULOCYTES NFR BLD AUTO: 0.2 % (ref 0–0.9)
LYMPHOCYTES # BLD AUTO: 2.07 X10*3/UL (ref 1.2–4.8)
LYMPHOCYTES NFR BLD AUTO: 36.9 %
MCH RBC QN AUTO: 31.7 PG (ref 26–34)
MCHC RBC AUTO-ENTMCNC: 34.1 G/DL (ref 32–36)
MCV RBC AUTO: 93 FL (ref 80–100)
MONOCYTES # BLD AUTO: 0.42 X10*3/UL (ref 0.1–1)
MONOCYTES NFR BLD AUTO: 7.5 %
NEUTROPHILS # BLD AUTO: 3.07 X10*3/UL (ref 1.2–7.7)
NEUTROPHILS NFR BLD AUTO: 54.6 %
NRBC BLD-RTO: 0 /100 WBCS (ref 0–0)
PLATELET # BLD AUTO: 251 X10*3/UL (ref 150–450)
RBC # BLD AUTO: 4.04 X10*6/UL (ref 4–5.2)
WBC # BLD AUTO: 5.6 X10*3/UL (ref 4.4–11.3)

## 2025-08-14 PROCEDURE — 85025 COMPLETE CBC W/AUTO DIFF WBC: CPT | Performed by: EMERGENCY MEDICINE

## 2025-08-14 PROCEDURE — 99285 EMERGENCY DEPT VISIT HI MDM: CPT | Performed by: EMERGENCY MEDICINE

## 2025-08-14 PROCEDURE — 93005 ELECTROCARDIOGRAM TRACING: CPT

## 2025-08-14 PROCEDURE — 84443 ASSAY THYROID STIM HORMONE: CPT | Performed by: EMERGENCY MEDICINE

## 2025-08-14 PROCEDURE — 84075 ASSAY ALKALINE PHOSPHATASE: CPT | Performed by: EMERGENCY MEDICINE

## 2025-08-14 PROCEDURE — 36415 COLL VENOUS BLD VENIPUNCTURE: CPT | Performed by: EMERGENCY MEDICINE

## 2025-08-14 PROCEDURE — 84702 CHORIONIC GONADOTROPIN TEST: CPT | Performed by: EMERGENCY MEDICINE

## 2025-08-14 PROCEDURE — 84484 ASSAY OF TROPONIN QUANT: CPT | Performed by: EMERGENCY MEDICINE

## 2025-08-14 ASSESSMENT — PAIN DESCRIPTION - DESCRIPTORS
DESCRIPTORS: PRESSURE
DESCRIPTORS: PRESSURE;ACHING

## 2025-08-14 ASSESSMENT — PAIN SCALES - GENERAL
PAINLEVEL_OUTOF10: 4
PAINLEVEL_OUTOF10: 4

## 2025-08-14 ASSESSMENT — PAIN - FUNCTIONAL ASSESSMENT: PAIN_FUNCTIONAL_ASSESSMENT: 0-10

## 2025-08-14 ASSESSMENT — PAIN DESCRIPTION - LOCATION: LOCATION: CHEST

## 2025-08-14 ASSESSMENT — PAIN DESCRIPTION - PAIN TYPE: TYPE: ACUTE PAIN

## 2025-08-14 ASSESSMENT — PAIN DESCRIPTION - ORIENTATION: ORIENTATION: MID

## 2025-08-14 ASSESSMENT — PAIN DESCRIPTION - FREQUENCY: FREQUENCY: INTERMITTENT

## 2025-08-15 ENCOUNTER — APPOINTMENT (OUTPATIENT)
Dept: CARDIOLOGY | Facility: HOSPITAL | Age: 25
End: 2025-08-15
Payer: COMMERCIAL

## 2025-08-15 ENCOUNTER — APPOINTMENT (OUTPATIENT)
Dept: RADIOLOGY | Facility: HOSPITAL | Age: 25
End: 2025-08-15
Payer: COMMERCIAL

## 2025-08-15 VITALS
OXYGEN SATURATION: 98 % | WEIGHT: 115 LBS | HEIGHT: 62 IN | RESPIRATION RATE: 18 BRPM | HEART RATE: 85 BPM | SYSTOLIC BLOOD PRESSURE: 128 MMHG | DIASTOLIC BLOOD PRESSURE: 76 MMHG | BODY MASS INDEX: 21.16 KG/M2 | TEMPERATURE: 98 F

## 2025-08-15 LAB
ALBUMIN SERPL BCP-MCNC: 4.5 G/DL (ref 3.4–5)
ALP SERPL-CCNC: 58 U/L (ref 33–110)
ALT SERPL W P-5'-P-CCNC: 7 U/L (ref 7–45)
ANION GAP SERPL CALC-SCNC: 13 MMOL/L (ref 10–20)
AST SERPL W P-5'-P-CCNC: 25 U/L (ref 9–39)
B-HCG SERPL-ACNC: <2 MIU/ML
BILIRUB SERPL-MCNC: 0.4 MG/DL (ref 0–1.2)
BUN SERPL-MCNC: 11 MG/DL (ref 6–23)
CALCIUM SERPL-MCNC: 9.3 MG/DL (ref 8.6–10.3)
CARDIAC TROPONIN I PNL SERPL HS: <3 NG/L (ref 0–13)
CARDIAC TROPONIN I PNL SERPL HS: <3 NG/L (ref 0–13)
CHLORIDE SERPL-SCNC: 106 MMOL/L (ref 98–107)
CO2 SERPL-SCNC: 22 MMOL/L (ref 21–32)
CREAT SERPL-MCNC: 0.67 MG/DL (ref 0.5–1.05)
EGFRCR SERPLBLD CKD-EPI 2021: >90 ML/MIN/1.73M*2
GLUCOSE SERPL-MCNC: 94 MG/DL (ref 74–99)
POTASSIUM SERPL-SCNC: 4.7 MMOL/L (ref 3.5–5.3)
PROT SERPL-MCNC: 7.9 G/DL (ref 6.4–8.2)
SODIUM SERPL-SCNC: 136 MMOL/L (ref 136–145)
TSH SERPL-ACNC: 2.35 MIU/L (ref 0.44–3.98)

## 2025-08-15 PROCEDURE — 2500000004 HC RX 250 GENERAL PHARMACY W/ HCPCS (ALT 636 FOR OP/ED): Performed by: EMERGENCY MEDICINE

## 2025-08-15 PROCEDURE — 70450 CT HEAD/BRAIN W/O DYE: CPT

## 2025-08-15 PROCEDURE — 96375 TX/PRO/DX INJ NEW DRUG ADDON: CPT

## 2025-08-15 PROCEDURE — 84484 ASSAY OF TROPONIN QUANT: CPT | Performed by: EMERGENCY MEDICINE

## 2025-08-15 PROCEDURE — 70450 CT HEAD/BRAIN W/O DYE: CPT | Performed by: RADIOLOGY

## 2025-08-15 PROCEDURE — 36415 COLL VENOUS BLD VENIPUNCTURE: CPT | Performed by: EMERGENCY MEDICINE

## 2025-08-15 PROCEDURE — 96374 THER/PROPH/DIAG INJ IV PUSH: CPT

## 2025-08-15 RX ORDER — DROPERIDOL 2.5 MG/ML
1.25 INJECTION, SOLUTION INTRAMUSCULAR; INTRAVENOUS ONCE
Status: COMPLETED | OUTPATIENT
Start: 2025-08-15 | End: 2025-08-15

## 2025-08-15 RX ORDER — KETOROLAC TROMETHAMINE 30 MG/ML
15 INJECTION, SOLUTION INTRAMUSCULAR; INTRAVENOUS ONCE
Status: COMPLETED | OUTPATIENT
Start: 2025-08-15 | End: 2025-08-15

## 2025-08-15 RX ADMIN — DROPERIDOL 1.25 MG: 2.5 INJECTION, SOLUTION INTRAMUSCULAR; INTRAVENOUS at 00:43

## 2025-08-15 RX ADMIN — KETOROLAC TROMETHAMINE 15 MG: 30 INJECTION, SOLUTION INTRAMUSCULAR at 00:43

## 2025-08-15 ASSESSMENT — PAIN - FUNCTIONAL ASSESSMENT
PAIN_FUNCTIONAL_ASSESSMENT: 0-10
PAIN_FUNCTIONAL_ASSESSMENT: 0-10

## 2025-08-15 ASSESSMENT — PAIN DESCRIPTION - LOCATION
LOCATION: CHEST
LOCATION: CHEST

## 2025-08-15 ASSESSMENT — PAIN SCALES - GENERAL
PAINLEVEL_OUTOF10: 4
PAINLEVEL_OUTOF10: 4

## 2025-08-15 ASSESSMENT — PAIN DESCRIPTION - PAIN TYPE: TYPE: ACUTE PAIN

## 2025-08-15 ASSESSMENT — PAIN DESCRIPTION - ORIENTATION: ORIENTATION: MID

## 2025-08-15 ASSESSMENT — PAIN DESCRIPTION - FREQUENCY: FREQUENCY: INTERMITTENT

## 2025-08-15 ASSESSMENT — PAIN DESCRIPTION - DESCRIPTORS: DESCRIPTORS: ACHING;PRESSURE

## 2025-08-27 ENCOUNTER — APPOINTMENT (OUTPATIENT)
Dept: PRIMARY CARE | Facility: CLINIC | Age: 25
End: 2025-08-27

## 2025-08-27 ASSESSMENT — ANXIETY QUESTIONNAIRES
4. TROUBLE RELAXING: NOT AT ALL
6. BECOMING EASILY ANNOYED OR IRRITABLE: NOT AT ALL
2. NOT BEING ABLE TO STOP OR CONTROL WORRYING: NOT AT ALL
IF YOU CHECKED OFF ANY PROBLEMS ON THIS QUESTIONNAIRE, HOW DIFFICULT HAVE THESE PROBLEMS MADE IT FOR YOU TO DO YOUR WORK, TAKE CARE OF THINGS AT HOME, OR GET ALONG WITH OTHER PEOPLE: NOT DIFFICULT AT ALL
5. BEING SO RESTLESS THAT IT IS HARD TO SIT STILL: NOT AT ALL
1. FEELING NERVOUS, ANXIOUS, OR ON EDGE: NOT AT ALL
GAD7 TOTAL SCORE: 0
7. FEELING AFRAID AS IF SOMETHING AWFUL MIGHT HAPPEN: NOT AT ALL
3. WORRYING TOO MUCH ABOUT DIFFERENT THINGS: NOT AT ALL

## 2025-08-27 ASSESSMENT — PAIN SCALES - GENERAL: PAINLEVEL_OUTOF10: 0-NO PAIN
